# Patient Record
Sex: FEMALE | Race: WHITE | Employment: PART TIME
[De-identification: names, ages, dates, MRNs, and addresses within clinical notes are randomized per-mention and may not be internally consistent; named-entity substitution may affect disease eponyms.]

---

## 2017-03-01 ENCOUNTER — OFFICE VISIT (OUTPATIENT)
Dept: OBGYN | Facility: CLINIC | Age: 22
End: 2017-03-01

## 2017-03-01 VITALS
SYSTOLIC BLOOD PRESSURE: 128 MMHG | DIASTOLIC BLOOD PRESSURE: 87 MMHG | HEART RATE: 78 BPM | BODY MASS INDEX: 22.76 KG/M2 | WEIGHT: 141 LBS | RESPIRATION RATE: 16 BRPM

## 2017-03-01 DIAGNOSIS — Z32.02 NEGATIVE PREGNANCY TEST: ICD-10-CM

## 2017-03-01 DIAGNOSIS — Z30.017 ENCOUNTER FOR INSERTION SUBDERMAL CONTRACEPTIVE: Primary | ICD-10-CM

## 2017-03-01 PROCEDURE — 81025 URINE PREGNANCY TEST: CPT | Performed by: SPECIALIST

## 2017-03-01 PROCEDURE — 11981 INSERTION DRUG DLVR IMPLANT: CPT | Performed by: SPECIALIST

## 2017-03-01 ASSESSMENT — ENCOUNTER SYMPTOMS
NAUSEA: 0
APNEA: 0
COUGH: 0
CONSTIPATION: 0
ABDOMINAL DISTENTION: 0
ABDOMINAL PAIN: 0
DIARRHEA: 0
EYE PAIN: 0
VOMITING: 0

## 2017-04-21 ENCOUNTER — OFFICE VISIT (OUTPATIENT)
Dept: OBGYN CLINIC | Age: 22
End: 2017-04-21
Payer: COMMERCIAL

## 2017-04-21 VITALS
BODY MASS INDEX: 22.18 KG/M2 | RESPIRATION RATE: 18 BRPM | HEIGHT: 66 IN | SYSTOLIC BLOOD PRESSURE: 125 MMHG | WEIGHT: 138 LBS | DIASTOLIC BLOOD PRESSURE: 92 MMHG | HEART RATE: 73 BPM

## 2017-04-21 DIAGNOSIS — Z30.46 NEXPLANON REMOVAL: Primary | ICD-10-CM

## 2017-04-21 PROCEDURE — 11982 REMOVE DRUG IMPLANT DEVICE: CPT | Performed by: SPECIALIST

## 2017-04-21 ASSESSMENT — ENCOUNTER SYMPTOMS
ABDOMINAL PAIN: 0
NAUSEA: 1
CONSTIPATION: 0
APNEA: 0
DIARRHEA: 0
EYE PAIN: 0
ABDOMINAL DISTENTION: 0
VOMITING: 0
COUGH: 0

## 2018-06-20 ENCOUNTER — OFFICE VISIT (OUTPATIENT)
Dept: PRIMARY CARE CLINIC | Age: 23
End: 2018-06-20
Payer: COMMERCIAL

## 2018-06-20 VITALS
DIASTOLIC BLOOD PRESSURE: 60 MMHG | HEIGHT: 66 IN | SYSTOLIC BLOOD PRESSURE: 122 MMHG | WEIGHT: 145.6 LBS | HEART RATE: 85 BPM | OXYGEN SATURATION: 98 % | BODY MASS INDEX: 23.4 KG/M2

## 2018-06-20 DIAGNOSIS — G56.01 CARPAL TUNNEL SYNDROME OF RIGHT WRIST: Primary | ICD-10-CM

## 2018-06-20 DIAGNOSIS — Z23 NEED FOR DIPHTHERIA-TETANUS-PERTUSSIS (TDAP) VACCINE, ADULT/ADOLESCENT: ICD-10-CM

## 2018-06-20 DIAGNOSIS — Z76.89 ENCOUNTER TO ESTABLISH CARE: ICD-10-CM

## 2018-06-20 PROCEDURE — 90715 TDAP VACCINE 7 YRS/> IM: CPT | Performed by: NURSE PRACTITIONER

## 2018-06-20 PROCEDURE — 90471 IMMUNIZATION ADMIN: CPT | Performed by: NURSE PRACTITIONER

## 2018-06-20 PROCEDURE — 99204 OFFICE O/P NEW MOD 45 MIN: CPT | Performed by: NURSE PRACTITIONER

## 2018-06-20 RX ORDER — IBUPROFEN 800 MG/1
800 TABLET ORAL EVERY 8 HOURS PRN
Qty: 90 TABLET | Refills: 1 | Status: SHIPPED | OUTPATIENT
Start: 2018-06-20 | End: 2020-09-18 | Stop reason: ALTCHOICE

## 2018-06-20 ASSESSMENT — ENCOUNTER SYMPTOMS
SHORTNESS OF BREATH: 0
CHEST TIGHTNESS: 0
TROUBLE SWALLOWING: 0

## 2018-06-20 ASSESSMENT — PATIENT HEALTH QUESTIONNAIRE - PHQ9
SUM OF ALL RESPONSES TO PHQ QUESTIONS 1-9: 0
2. FEELING DOWN, DEPRESSED OR HOPELESS: 0
SUM OF ALL RESPONSES TO PHQ9 QUESTIONS 1 & 2: 0
1. LITTLE INTEREST OR PLEASURE IN DOING THINGS: 0

## 2018-08-28 ENCOUNTER — OFFICE VISIT (OUTPATIENT)
Dept: PRIMARY CARE CLINIC | Age: 23
End: 2018-08-28
Payer: COMMERCIAL

## 2018-08-28 VITALS
BODY MASS INDEX: 24.59 KG/M2 | HEIGHT: 64 IN | WEIGHT: 144 LBS | SYSTOLIC BLOOD PRESSURE: 119 MMHG | OXYGEN SATURATION: 100 % | DIASTOLIC BLOOD PRESSURE: 76 MMHG | HEART RATE: 75 BPM

## 2018-08-28 DIAGNOSIS — G56.01 CARPAL TUNNEL SYNDROME ON RIGHT: Primary | ICD-10-CM

## 2018-08-28 PROCEDURE — 99213 OFFICE O/P EST LOW 20 MIN: CPT | Performed by: NURSE PRACTITIONER

## 2018-08-28 RX ORDER — PREDNISONE 20 MG/1
40 TABLET ORAL DAILY
Qty: 10 TABLET | Refills: 0 | Status: SHIPPED | OUTPATIENT
Start: 2018-08-28 | End: 2018-09-02

## 2018-08-28 ASSESSMENT — ENCOUNTER SYMPTOMS
CHEST TIGHTNESS: 0
SHORTNESS OF BREATH: 0
COLOR CHANGE: 0
TROUBLE SWALLOWING: 0

## 2018-08-28 NOTE — PATIENT INSTRUCTIONS
Patient Education        Carpal Tunnel Syndrome: Care Instructions  Your Care Instructions    Carpal tunnel syndrome is a nerve problem. It can cause tingling, numbness, weakness, or pain in the fingers, thumb, and hand. The median nerve and several tough tissues called tendons run through a space in the wrist called the carpal tunnel. The repeated hand motions used in work and some hobbies and sports can put pressure on the nerve. Pregnancy and several conditions, including diabetes, arthritis, and an underactive thyroid, also can cause carpal tunnel syndrome. You may be able to limit an activity or do it differently to reduce your symptoms. You also can take other steps to feel better. If your symptoms are mild, 1 to 2 weeks of home treatment are likely to ease your pain. Surgery is needed only if other treatments do not work. Follow-up care is a key part of your treatment and safety. Be sure to make and go to all appointments, and call your doctor if you are having problems. It's also a good idea to know your test results and keep a list of the medicines you take. How can you care for yourself at home? · If possible, stop or reduce the activity that causes your symptoms. If you cannot stop the activity, take frequent breaks to rest and stretch or change hand positions to do a task. Try switching hands, such as when using a computer mouse. · Try to avoid bending or twisting your wrists. · Ask your doctor if you can take an over-the-counter pain medicine, such as acetaminophen (Tylenol), ibuprofen (Advil, Motrin), or naproxen (Aleve). Be safe with medicines. Read and follow all instructions on the label. · If your doctor prescribes corticosteroid medicine to help reduce pain and swelling, take it exactly as prescribed. Call your doctor if you think you are having a problem with your medicine. · Put ice or a cold pack on your wrist for 10 to 20 minutes at a time to ease pain.  Put a thin cloth between the ice who are sick or have infections. Do not receive a \"live\" vaccine while using prednisone. Call your doctor at once if you have shortness of breath, severe pain in your upper stomach, bloody or tarry stools, severe depression, changes in personality or behavior, vision problems, or eye pain. You should not stop using prednisone suddenly. Follow your doctor's instructions about tapering your dose. What is prednisone? Prednisone is a steroid. Prednisone prevents the release of substances in the body that cause inflammation. Prednisone also suppresses the immune system. Prednisone is used as an anti-inflammatory or an immunosuppressant medication. Prednisone treats many different conditions such as allergic disorders, skin conditions, ulcerative colitis, arthritis, lupus, psoriasis, or breathing disorders. Prednisone may also be used for purposes not listed in this medication guide. What should I discuss with my healthcare provider before taking prednisone? You should not use this medication if you are allergic to prednisone, or if you have a fungal infection anywhere in your body. Steroid medication can weaken your immune system, making it easier for you to get an infection or worsening an infection you already have or have recently had. Tell your doctor about any illness or infection you have had within the past several weeks. To make sure prednisone is safe for you, tell your doctor if you have:  · any illness that causes diarrhea;  · liver disease (such as cirrhosis);  · kidney disease;  · heart disease, high blood pressure, low levels of potassium in your blood;  · a thyroid disorder;  · diabetes;  · a history of malaria;  · tuberculosis;  · osteoporosis;  · glaucoma, cataracts, or herpes infection of the eyes;  · stomach ulcers, ulcerative colitis, or a history of stomach bleeding;  · a muscle disorder such as myasthenia gravis; or  · depression or mental illness.   Long-term use of steroids may lead to treats you should know that you are using a steroid. Store at room temperature away from moisture and heat. What happens if I miss a dose? Take the missed dose as soon as you remember. Skip the missed dose if it is almost time for your next scheduled dose. Do not take extra medicine to make up the missed dose. What happens if I overdose? Seek emergency medical attention or call the Poison Help line at 1-878.861.4308. An overdose of prednisone is not expected to produce life threatening symptoms. However, long term use of high steroid doses can lead to symptoms such as thinning skin, easy bruising, changes in the shape or location of body fat (especially in your face, neck, back, and waist), increased acne or facial hair, menstrual problems, impotence, or loss of interest in sex. What should I avoid while taking prednisone? Avoid being near people who are sick or have infections. Call your doctor for preventive treatment if you are exposed to chicken pox or measles. These conditions can be serious or even fatal in people who are using a steroid. Do not receive a \"live\" vaccine while using prednisone. Prednisone may increase your risk of harmful effects from a live vaccine. Live vaccines include measles, mumps, rubella (MMR), rotavirus, typhoid, yellow fever, varicella (chickenpox), zoster (shingles), and nasal flu (influenza) vaccine. Avoid drinking alcohol while you are taking prednisone. What are the possible side effects of prednisone? Get emergency medical help if you have any of these signs of an allergic reaction: hives; difficult breathing; swelling of your face, lips, tongue, or throat.   Call your doctor at once if you have:  · blurred vision, eye pain, or seeing halos around lights;  · swelling, rapid weight gain, feeling short of breath;  · severe depression, feelings of extreme happiness or sadness, changes in personality or behavior, seizure (convulsions);  · bloody or tarry stools, coughing

## 2020-08-21 ENCOUNTER — HOSPITAL ENCOUNTER (EMERGENCY)
Age: 25
Discharge: HOME | End: 2020-08-21
Attending: EMERGENCY MEDICINE

## 2020-08-21 ENCOUNTER — APPOINTMENT (OUTPATIENT)
Dept: CT IMAGING | Age: 25
End: 2020-08-21

## 2020-08-21 ENCOUNTER — APPOINTMENT (OUTPATIENT)
Dept: ULTRASOUND IMAGING | Age: 25
End: 2020-08-21

## 2020-08-21 VITALS
RESPIRATION RATE: 14 BRPM | HEART RATE: 84 BPM | WEIGHT: 169 LBS | DIASTOLIC BLOOD PRESSURE: 85 MMHG | OXYGEN SATURATION: 98 % | HEIGHT: 66 IN | BODY MASS INDEX: 27.16 KG/M2 | TEMPERATURE: 98.1 F | SYSTOLIC BLOOD PRESSURE: 119 MMHG

## 2020-08-21 LAB
HCG(URINE) PREGNANCY TEST: NEGATIVE
INR BLD: 1
PARTIAL THROMBOPLASTIN TIME: 25.7 SEC (ref 21.3–31.3)
PROTHROMBIN TIME: 10.4 SEC (ref 9.4–12.6)

## 2020-08-21 PROCEDURE — 99284 EMERGENCY DEPT VISIT MOD MDM: CPT

## 2020-08-21 PROCEDURE — 36415 COLL VENOUS BLD VENIPUNCTURE: CPT

## 2020-08-21 PROCEDURE — 6370000000 HC RX 637 (ALT 250 FOR IP): Performed by: EMERGENCY MEDICINE

## 2020-08-21 PROCEDURE — 72192 CT PELVIS W/O DYE: CPT

## 2020-08-21 PROCEDURE — 93971 EXTREMITY STUDY: CPT

## 2020-08-21 PROCEDURE — 72131 CT LUMBAR SPINE W/O DYE: CPT

## 2020-08-21 PROCEDURE — 81025 URINE PREGNANCY TEST: CPT

## 2020-08-21 PROCEDURE — 85730 THROMBOPLASTIN TIME PARTIAL: CPT

## 2020-08-21 PROCEDURE — 85610 PROTHROMBIN TIME: CPT

## 2020-08-21 RX ADMIN — APIXABAN 10 MG: 5 TABLET, FILM COATED ORAL at 17:37

## 2020-08-21 ASSESSMENT — PAIN SCALES - GENERAL: PAINLEVEL_OUTOF10: 4

## 2020-08-21 NOTE — LETTER
[id number] [facility] ED  [address] [facility] [address]  Phone: [phone]  Fax: [phone]               August 21, 2020    Patient: Irlanda Rivera   YOB: 1995   Date of Visit: 8/21/2020       To Whom It May Concern:    Kyle Valdes was seen and treated in our emergency department on 8/21/2020. She may return to work on 08.       Sincerely,       Jadyn Nunez MD         Signature:__________________________________

## 2020-08-21 NOTE — ED PROVIDER NOTES
[facility]  eMERGENCY dEPARTMENT eNCOUnter      Pt Name: Terry Mera  MRN: [id number]  [place] 1995  Date of evaluation: 8/21/2020      CHIEF COMPLAINT       Chief Complaint   Patient presents with    Leg Swelling     Left leg    Leg Pain         HISTORY OF PRESENT ILLNESS      The patient presents with pain in the left hip and back. She wasn't sure if she had swelling. Contrary to what the nurse reports, she is not here for swelling, but rather pain. She started having some back pain several days ago. She said ibuprofen took the pain away completely, but then it would come back. She is having pain along the lateral aspect of the left hip, and towards the groin medially. The pain in the groin is at rest.  The pain in the lateral hip is with movement. She says she is not really having the back pain anymore. This was in the low back across the lumbar area. The patient says she is not having true weakness or numbness. She says she feels fullness in the thigh, but no actual numbness. She has no functional deficits. She denies bowel or bladder issues. She denies fever. She says the pain is tolerable at this time. She is not having any swelling or redness of the calf or thigh. She does not recall any trauma. REVIEW OF SYSTEMS       All systems reviewed and negative unless noted in HPI. The patient denies fever or constitutional symptoms. Denies vision change. Denies any sore throat or rhinorrhea. Denies any neck pain or stiffness. Denies chest pain or shortness of breath. No nausea,  vomiting or diarrhea. Denies any dysuria. Denies urinary frequency or hematuria. Back and left hip pain as noted in HPI. Denies any weakness, numbness or focal neurologic deficit. Denies any skin rash or edema. No recent psychiatric issues. No easy bruising or bleeding. Denies any polyuria, polydypsia or history of immunocompromise.       PAST MEDICAL HISTORY    has a past medical history of Ultrasound recheck of fetal pyelectasis, antepartum. SURGICAL HISTORY      has a past surgical history that includes Carpal tunnel release (Right). CURRENT MEDICATIONS       Previous Medications    ELASTIC BANDAGES & SUPPORTS (WRIST SPLINT) MISC    Apply 1 each topically nightly Cock up wrist splint- right wrist    IBUPROFEN (IBU) 800 MG TABLET    Take 1 tablet by mouth every 8 hours as needed for Pain Take with food       ALLERGIES     has No Known Allergies. FAMILY HISTORY     She indicated that her mother is alive. She indicated that her father is alive. She indicated that her sister is alive. She indicated that her brother is alive. She indicated that her maternal grandmother is alive. She indicated that her maternal grandfather is alive. She indicated that her paternal grandmother is . She indicated that her paternal grandfather is . She indicated that the status of her neg hx is unknown.     family history includes Hypertension in her maternal grandmother. SOCIAL HISTORY      reports that she has never smoked. She has never used smokeless tobacco. She reports that she does not drink alcohol or use drugs. PHYSICAL EXAM     INITIAL VITALS:  height is 5' 6\" (1.676 m) and weight is 76.7 kg (169 lb). Her temperature is 98.1 °F (36.7 °C). Her blood pressure is 117/91 (abnormal) and her pulse is 82. Her respiration is 15 and oxygen saturation is 98%. Patient is alert and oriented, in no apparent distress. HEENT is atraumatic. Pupils are PERRL at 4 mm with normal extraocular motion. Mucous membranes moist.    Neck is supple with no lymphadenopathy. No JVD. No meningismus. Heart sounds regular rate and rhythm with no gallops, murmurs, or rubs. Lungs clear, no wheezes, rales or rhonchi. Abdomen: soft, nontender with no pain to palpation. No pulsatile mass. Normal bowel sounds are noted. No rebound or guarding. within the pelvis.  No bladder calculus.  Inflammatory changes   along the left iliac vein extending into the left groin region.  The left   iliac vein appears enlarged. CT LUMBAR SPINE:     No acute fracture.  No subluxation.  Disc spaces are preserved.  No   significant degenerative changes.                  Preliminary result by Soledad Ulloa MD (08/21/20 16:01:16)                 Impression:     Left retroperitoneal inflammatory changes which appear to be centered upon   the iliac vein suggesting thrombophlebitis.  Consider follow-up with left   lower extremity deep venous sonography. No other acute findings involving the lumbar spine or pelvis.                       CT LUMBAR SPINE WO CONTRAST (Preliminary result)   Result time 08/21/20 16:05:43   Preliminary result by Soledad Ulloa MD (08/21/20 16:05:43)                 Impression:     Left retroperitoneal inflammatory changes which appear to be centered upon   the iliac vein suggesting thrombophlebitis.  Consider follow-up with left   lower extremity deep venous sonography. No other acute findings involving the lumbar spine or pelvis. Narrative:     EXAMINATION:   CT OF THE PELVIS WITHOUT CONTRAST; CT OF THE LUMBAR SPINE WITHOUT CONTRAST   8/21/2020 3:24 pm     TECHNIQUE:   CT of the pelvis was performed without the administration of intravenous   contrast.  Multiplanar reformatted images are provided for review. Dose   modulation, iterative reconstruction, and/or weight based adjustment of the   mA/kV was utilized to reduce the radiation dose to as low as reasonably   achievable.; CT of the lumbar spine was performed without the administration   of intravenous contrast. Multiplanar reformatted images are provided for   review. Dose modulation, iterative reconstruction, and/or weight based   adjustment of the mA/kV was utilized to reduce the radiation dose to as low   as reasonably achievable. COMPARISON:   None. HISTORY:   ORDERING SYSTEM PROVIDED HISTORY: left hip pain   TECHNOLOGIST PROVIDED HISTORY:   left hip pain     Is the patient pregnant?->No   Reason for Exam: Pt states has had recent low back pain for about a week and   now has anterior left leg numbness and pain for the last 3 days.  No known   injury   Acuity: Acute   Type of Exam: Initial; ORDERING SYSTEM PROVIDED HISTORY: pain   TECHNOLOGIST PROVIDED HISTORY:   pain   Is the patient pregnant?->No   Reason for Exam: Pt states has had recent low back pain for about a week and   now has anterior left leg numbness and pain for the last 3 days.  No known   injury   Acuity: Acute   Type of Exam: Initial     FINDINGS:   CT PELVIS:     No acute fracture.  No widening of the sacroiliac joints.  No erosive   changes.  Hip joint spaces are preserved. No free fluid within the pelvis.  No bladder calculus.  Inflammatory changes   along the left iliac vein extending into the left groin region.  The left   iliac vein appears enlarged. CT LUMBAR SPINE:     No acute fracture.  No subluxation.  Disc spaces are preserved.  No   significant degenerative changes.                  Preliminary result by Shraddha Tsai MD (08/21/20 16:01:16)                 Impression:     Left retroperitoneal inflammatory changes which appear to be centered upon   the iliac vein suggesting thrombophlebitis.  Consider follow-up with left   lower extremity deep venous sonography.      No other acute findings involving the lumbar spine or pelvis.                        LABS:  Results for orders placed or performed during the hospital encounter of 08/21/20   Pregnancy, Urine   Result Value Ref Range    HCG(Urine) Pregnancy Test NEGATIVE NEGATIVE   Protime-INR   Result Value Ref Range    Protime 10.4 9.4 - 12.6 sec    INR 1.0    APTT   Result Value Ref Range    PTT 25.7 21.3 - 31.3 sec         EMERGENCY DEPARTMENT COURSE:   Vitals:    Vitals:    08/21/20 1433 08/21/20 1634   BP: (!) 124/107 (!) 117/91   Pulse: 103 82   Resp: 16 15   Temp: 98.1 °F (36.7 °C)    SpO2: 99% 98%   Weight: 76.7 kg (169 lb)    Height: 5' 6\" (1.676 m)      -------------------------  BP: (!) 117/91(pt asymptomatic), Temp: 98.1 °F (36.7 °C), Pulse: 82, Resp: 15      Re-evaluation Notes    The patient will be started on Eliquis here and written for a starter pack. I have given her referral to a vascular specialist and PCP. He indicates she will not need to have him hypercoagulable workup at this juncture. This may be something to do in the future. She is discharged in good condition. CONSULTS:    2326  Discussed with [facility]. Confirms DVT clot in femoral, common femoral, and popliteal veins. [id number]  Vascular surgeon paged. [phone]  Discussed with Dr. Alina Her. Ileofemoral is more concerning. Looks acute on U/S. Start on Eliquis starter pack. Can see in office in 1-2 weeks. FINAL IMPRESSION      1. Acute deep vein thrombosis (DVT) of left lower extremity, unspecified vein (HCC)          DISPOSITION/PLAN   DISPOSITION        Condition on Disposition    good    PATIENT REFERRED TO:  Carito Rudd MD    [facility]  [phone]      1-2 weeks    José Miguel Manzano MD  [facility] [address] [place] [address] Road  [phone]    In 3 days        DISCHARGE MEDICATIONS:  New Prescriptions    APIXABAN (ELIQUIS DVT/PE STARTER PACK) 5 MG TABS TABLET    Take 10 mg (2 tablets) orally twice daily for 7 days, then take 5 mg (1 tablet) orally twice daily thereafter. May substitute tablets if starter pack not available.        (Please note that portions of this note were completed with a voice recognition program.  Efforts were made to edit the dictations but occasionally words are mis-transcribed.)    Plata MD   Attending Emergency Physician         Dayron Hays MD  08/21/20 [id number]

## 2020-08-21 NOTE — ED NOTES
Pt arrives to ER with c/o left leg pain and swelling. She denies injury or trauma. She states that she had back pain, which now has resolved. She states that her pain is in her thigh. Pt ambulates with a limp. Pt resting in bed, comfort offered, no concerns no s/s of distress.       Paola De La Paz RN  08/21/20 [id number]

## 2020-08-21 NOTE — ED NOTES
Dr Jigna Sinclair at bedside updating patient on results and plan of care.       Russell Pierre RN  08/21/20 [id number]

## 2020-08-24 ENCOUNTER — TELEPHONE (OUTPATIENT)
Dept: PRIMARY CARE CLINIC | Age: 25
End: 2020-08-24

## 2020-08-24 ENCOUNTER — NURSE TRIAGE (OUTPATIENT)
Dept: OTHER | Facility: CLINIC | Age: 25
End: 2020-08-24

## 2020-08-24 ENCOUNTER — OFFICE VISIT (OUTPATIENT)
Dept: PRIMARY CARE CLINIC | Age: 25
End: 2020-08-24

## 2020-08-24 VITALS
WEIGHT: 169 LBS | DIASTOLIC BLOOD PRESSURE: 80 MMHG | HEART RATE: 136 BPM | OXYGEN SATURATION: 98 % | SYSTOLIC BLOOD PRESSURE: 118 MMHG | HEIGHT: 66 IN | BODY MASS INDEX: 27.16 KG/M2

## 2020-08-24 PROCEDURE — 99214 OFFICE O/P EST MOD 30 MIN: CPT | Performed by: FAMILY MEDICINE

## 2020-08-24 RX ORDER — SUMATRIPTAN 25 MG/1
25 TABLET, FILM COATED ORAL
Qty: 9 TABLET | Refills: 3 | Status: SHIPPED | OUTPATIENT
Start: 2020-08-24 | End: 2021-04-12

## 2020-08-24 ASSESSMENT — PATIENT HEALTH QUESTIONNAIRE - PHQ9
SUM OF ALL RESPONSES TO PHQ9 QUESTIONS 1 & 2: 0
1. LITTLE INTEREST OR PLEASURE IN DOING THINGS: 0
SUM OF ALL RESPONSES TO PHQ QUESTIONS 1-9: 0
SUM OF ALL RESPONSES TO PHQ QUESTIONS 1-9: 0
2. FEELING DOWN, DEPRESSED OR HOPELESS: 0

## 2020-08-24 NOTE — PROGRESS NOTES
2014      Past Surgical History:   Procedure Laterality Date    CARPAL TUNNEL RELEASE Right        Family History   Problem Relation Age of Onset    Hypertension Maternal Grandmother     Breast Cancer Neg Hx     Cancer Neg Hx     Colon Cancer Neg Hx     Diabetes Neg Hx     Eclampsia Neg Hx     Ovarian Cancer Neg Hx      Labor Neg Hx     Spont Abortions Neg Hx     Stroke Neg Hx        Social History     Tobacco Use    Smoking status: Never Smoker    Smokeless tobacco: Never Used   Substance Use Topics    Alcohol use: No      Current Outpatient Medications   Medication Sig Dispense Refill    SUMAtriptan (IMITREX) 25 MG tablet Take 1 tablet by mouth once as needed for Migraine May take an additional dose 2 hours after if needed once 9 tablet 3    apixaban (ELIQUIS DVT/PE STARTER PACK) 5 MG TABS tablet Take 10 mg (2 tablets) orally twice daily for 7 days, then take 5 mg (1 tablet) orally twice daily thereafter. May substitute tablets if starter pack not available. 74 tablet 0    Elastic Bandages & Supports (WRIST SPLINT) MISC Apply 1 each topically nightly Cock up wrist splint- right wrist 1 each 0    ibuprofen (IBU) 800 MG tablet Take 1 tablet by mouth every 8 hours as needed for Pain Take with food 90 tablet 1     No current facility-administered medications for this visit. No Known Allergies    Health Maintenance   Topic Date Due    Varicella vaccine (1 of 2 - 2-dose childhood series) 1994    HPV vaccine (1 - 2-dose series) 2004    Cervical cancer screen  2014    Flu vaccine (1) 2020    DTaP/Tdap/Td vaccine (2 - Td) 2028    Hepatitis B vaccine  Completed    HIV screen  Completed    Hepatitis A vaccine  Aged Out    Hib vaccine  Aged Out    Meningococcal (ACWY) vaccine  Aged Out    Pneumococcal 0-64 years Vaccine  Aged Out       Subjective:      Review of Systems   Constitutional: Negative for chills and fever.    Respiratory: Negative for tablet; Refill: 3    3. Pain of left hip joint  - some improvement since starting eliquis, but still with pain, recommend tylenol. Pain 2/2 dvt. Plan:      Return in about 3 months (around 11/24/2020) for physical exam and pap. No orders of the defined types were placed in this encounter. Orders Placed This Encounter   Medications    SUMAtriptan (IMITREX) 25 MG tablet     Sig: Take 1 tablet by mouth once as needed for Migraine May take an additional dose 2 hours after if needed once     Dispense:  9 tablet     Refill:  3        Patient given educational materials - see patient instructions. Discussed use, benefit, and side effects of prescribed medications. All patient questions answered. Pt voiced understanding. Reviewed healthmaintenance. Instructed to continue current medications, diet and exercise. Patient agreed with treatment plan. Follow up as directed.      Electronically signed by Donny Sethi DO on 8/26/2020 at 9:11 PM

## 2020-08-24 NOTE — TELEPHONE ENCOUNTER
Calling for follow up appt from ED visit on Friday for DVT. No worsening symptoms.   Soft transfer back to [facility]    Reason for Disposition   General information question, no triage required and triager able to answer question    Protocols used: INFORMATION ONLY CALL - NO TRIAGE-ADULT-

## 2020-08-26 ASSESSMENT — ENCOUNTER SYMPTOMS
ABDOMINAL PAIN: 0
CHEST TIGHTNESS: 0
NAUSEA: 0
SHORTNESS OF BREATH: 0
VOMITING: 0

## 2020-09-18 ENCOUNTER — OFFICE VISIT (OUTPATIENT)
Dept: VASCULAR SURGERY | Age: 25
End: 2020-09-18

## 2020-09-18 VITALS
DIASTOLIC BLOOD PRESSURE: 78 MMHG | BODY MASS INDEX: 27 KG/M2 | SYSTOLIC BLOOD PRESSURE: 114 MMHG | HEIGHT: 66 IN | HEART RATE: 90 BPM | RESPIRATION RATE: 16 BRPM | WEIGHT: 168 LBS | TEMPERATURE: 96.6 F | OXYGEN SATURATION: 97 %

## 2020-09-18 PROCEDURE — 99024 POSTOP FOLLOW-UP VISIT: CPT | Performed by: SURGERY

## 2020-09-18 NOTE — PROGRESS NOTES
Division of Vascular Surgery        New Consult      Physician Requesting Consult:  Vandana Roberts MD    Reason for Consult:   DVT    Chief Complaint:      DVT, left leg swelling    History of Present Illness:      Fabiaon Calderon is a 30 y.o. woman who presents with left lower extremity DVT about a month ago. She was started on anticoagulation by the ER. She continues to have significant swelling, discoloration and heaviness/pain when on it for long periods of time. She works 10 hour night shifts at the [employer]. She says she was never told to wear compression stockings. She denies any prior history of DVT or significant family history of it. Medical History:     Past Medical History:   Diagnosis Date    Ultrasound recheck of fetal pyelectasis, antepartum 2012       Surgical History:     Past Surgical History:   Procedure Laterality Date    CARPAL TUNNEL RELEASE Right        Family History:     Family History   Problem Relation Age of Onset    Hypertension Maternal Grandmother     Breast Cancer Neg Hx     Cancer Neg Hx     Colon Cancer Neg Hx     Diabetes Neg Hx     Eclampsia Neg Hx     Ovarian Cancer Neg Hx      Labor Neg Hx     Spont Abortions Neg Hx     Stroke Neg Hx        Allergies:       Patient has no known allergies. Medications:      Current Outpatient Medications   Medication Sig Dispense Refill    SUMAtriptan (IMITREX) 25 MG tablet Take 1 tablet by mouth once as needed for Migraine May take an additional dose 2 hours after if needed once 9 tablet 3    apixaban (ELIQUIS DVT/PE STARTER PACK) 5 MG TABS tablet Take 10 mg (2 tablets) orally twice daily for 7 days, then take 5 mg (1 tablet) orally twice daily thereafter. May substitute tablets if starter pack not available.  74 tablet 0    Elastic Bandages & Supports (WRIST SPLINT) MISC Apply 1 each topically nightly Cock up wrist splint- right wrist 1 each 0     No current facility-administered medications for this visit. Social History:     Tobacco:    reports that she has never smoked. She has never used smokeless tobacco.  Alcohol:      reports no history of alcohol use. Drug Use:  reports no history of drug use. Occupation:  Works in the [employer]:     Review of Systems   Constitutional: Negative for chills and fever. HENT: Negative for congestion. Eyes: Negative for visual disturbance. Respiratory: Negative for chest tightness and shortness of breath. Cardiovascular: Positive for leg swelling. Negative for chest pain. Gastrointestinal: Negative for abdominal pain. Endocrine: Negative. Genitourinary: Negative. Musculoskeletal: Negative. Skin: Positive for color change. Negative for wound. Allergic/Immunologic: Negative. Neurological: Negative for facial asymmetry, speech difficulty, weakness and numbness. Hematological: Negative. Psychiatric/Behavioral: Negative. Physical Exam:     Vitals:  /78 (Site: Right Upper Arm, Position: Sitting, Cuff Size: Medium Adult)   Pulse 90   Temp 96.6 °F (35.9 °C) (Temporal)   Resp 16   Ht 5' 6\" (1.676 m)   Wt 168 lb (76.2 kg)   SpO2 97%   BMI 27.12 kg/m²     Physical Exam  Constitutional:       Appearance: She is well-developed and well-groomed. Eyes:      Extraocular Movements: Extraocular movements intact. Conjunctiva/sclera: Conjunctivae normal.   Neck:      Musculoskeletal: Full passive range of motion without pain. Vascular: No carotid bruit. Cardiovascular:      Rate and Rhythm: Normal rate and regular rhythm. Pulses:           Popliteal pulses are 2+ on the left side. Dorsalis pedis pulses are 2+ on the left side. Pulmonary:      Effort: Pulmonary effort is normal. No respiratory distress. Abdominal:      Palpations: Abdomen is soft. Tenderness: There is no abdominal tenderness.    Musculoskeletal:      Left upper leg: She exhibits swelling. She exhibits no tenderness. Left lower leg: She exhibits tenderness and swelling. Left foot: Normal capillary refill. Swelling present. No tenderness. Feet:      Left foot:      Skin integrity: No ulcer or skin breakdown. Skin:     General: Skin is warm. Capillary Refill: Capillary refill takes less than 2 seconds. Comments: Purplish discoloration of left leg with venous congestion   Neurological:      Mental Status: She is alert and oriented to person, place, and time. GCS: GCS eye subscore is 4. GCS verbal subscore is 5. GCS motor subscore is 6. Sensory: Sensation is intact. Motor: Motor function is intact. Psychiatric:         Mood and Affect: Mood normal.         Speech: Speech normal.         Behavior: Behavior normal.         Thought Content: Thought content normal.         Imaging/Labs:     Venous duplex reveals left popliteal to common femoral vein DVT    CT pelvis reveals distention around left iliac vein    Assessment and Plan:     Left lower extremity DVT with significant swelling and phlegmasia  · Continue anticoagulation  · Will need compression therapy, ACE wraps applied and compression stocking given to her  · She will call me back in 1 week to see how her swelling is doing, if not improved will discuss with performing percutaneous mechanical thrombectomy. · Discussed some of the details of the procedure  · She does not have insurance so will only book the procedure if she really needs it    Electronically signed by Abiel Mckinney MD on 9/18/20 at 9:58 AM EDT      [address] [facility]  Office: [phone]  Cell: [phone]  Email: [email or web address]

## 2020-09-19 ASSESSMENT — ENCOUNTER SYMPTOMS
ABDOMINAL PAIN: 0
ALLERGIC/IMMUNOLOGIC NEGATIVE: 1
COLOR CHANGE: 1
SHORTNESS OF BREATH: 0
CHEST TIGHTNESS: 0

## 2020-09-28 ENCOUNTER — PATIENT MESSAGE (OUTPATIENT)
Dept: PRIMARY CARE CLINIC | Age: 25
End: 2020-09-28

## 2020-09-28 NOTE — TELEPHONE ENCOUNTER
From: Jose May  To: Janelle Sheridan DO  Sent: 9/28/2020 4:55 PM EDT  Subject: Prescription Question    I was wondering if I could get another of the samples for eliquis. I cant get help through their website. Can I be switched to warfarin or something affordable until my insurance kicks in at the end of November? I can not afford eliquis and if my prescription cant be changed then Ill have to stop taking them all together until my insurance starts. I work a lot, so if you try to call and I do not answer, its okay to leave a voicemail.      Thank you

## 2020-09-29 ENCOUNTER — TELEPHONE (OUTPATIENT)
Dept: VASCULAR SURGERY | Age: 25
End: 2020-09-29

## 2020-09-29 RX ORDER — WARFARIN SODIUM 5 MG/1
TABLET ORAL
Qty: 30 TABLET | Refills: 2 | Status: SHIPPED | OUTPATIENT
Start: 2020-09-29 | End: 2020-10-09

## 2020-10-09 ENCOUNTER — OFFICE VISIT (OUTPATIENT)
Dept: VASCULAR SURGERY | Age: 25
End: 2020-10-09

## 2020-10-09 VITALS
DIASTOLIC BLOOD PRESSURE: 87 MMHG | HEART RATE: 82 BPM | SYSTOLIC BLOOD PRESSURE: 127 MMHG | RESPIRATION RATE: 18 BRPM | HEIGHT: 66 IN | WEIGHT: 164 LBS | TEMPERATURE: 97.1 F | BODY MASS INDEX: 26.36 KG/M2

## 2020-10-09 PROCEDURE — 99024 POSTOP FOLLOW-UP VISIT: CPT | Performed by: SURGERY

## 2020-10-12 NOTE — PROGRESS NOTES
Mrs. Shaneka Lopez comes in today for re-evaluation of her left leg DVT. She has been compliant with her medications with Eliquis and has been wearing the compression stockings. Her swelling has improved significantly along with the discoloration and the pain she was having. She continues to work at [email or web address], 3rd shift. Continue to wear compression stockings and will need total of 6 months of therapy for her DVT. She will follow up then with repeat venous duplex for re-evaluation. Ok to continue to work.         Electronically signed by Jose Alejandro Amaro MD on 10/12/2020 at 8:41 AM

## 2021-02-25 ENCOUNTER — PATIENT MESSAGE (OUTPATIENT)
Dept: PRIMARY CARE CLINIC | Age: 26
End: 2021-02-25

## 2021-02-25 NOTE — TELEPHONE ENCOUNTER
From: Carmen Kern  To: Jeff Saenz DO  Sent: 2/25/2021 8:07 AM EST  Subject: Prescription Question    Hello,  I was wondering if you could refill my prescription for the eliquis?   Thank you

## 2021-04-08 DIAGNOSIS — I82.4Y3 DEEP VEIN THROMBOSIS (DVT) OF PROXIMAL VEIN OF BOTH LOWER EXTREMITIES, UNSPECIFIED CHRONICITY (HCC): Primary | ICD-10-CM

## 2021-04-09 ENCOUNTER — HOSPITAL ENCOUNTER (OUTPATIENT)
Dept: VASCULAR LAB | Age: 26
Discharge: HOME | End: 2021-04-09
Payer: COMMERCIAL

## 2021-04-09 ENCOUNTER — OFFICE VISIT (OUTPATIENT)
Dept: VASCULAR SURGERY | Age: 26
End: 2021-04-09
Payer: COMMERCIAL

## 2021-04-09 VITALS
OXYGEN SATURATION: 98 % | HEART RATE: 77 BPM | WEIGHT: 160 LBS | TEMPERATURE: 99.5 F | HEIGHT: 66 IN | SYSTOLIC BLOOD PRESSURE: 106 MMHG | BODY MASS INDEX: 25.71 KG/M2 | DIASTOLIC BLOOD PRESSURE: 80 MMHG

## 2021-04-09 DIAGNOSIS — I82.4Y3 DEEP VEIN THROMBOSIS (DVT) OF PROXIMAL VEIN OF BOTH LOWER EXTREMITIES, UNSPECIFIED CHRONICITY (HCC): ICD-10-CM

## 2021-04-09 DIAGNOSIS — I82.422 ACUTE DEEP VEIN THROMBOSIS (DVT) OF ILIAC VEIN OF LEFT LOWER EXTREMITY (HCC): Primary | ICD-10-CM

## 2021-04-09 PROCEDURE — 93970 EXTREMITY STUDY: CPT

## 2021-04-09 PROCEDURE — 1036F TOBACCO NON-USER: CPT | Performed by: SURGERY

## 2021-04-09 PROCEDURE — 99214 OFFICE O/P EST MOD 30 MIN: CPT | Performed by: SURGERY

## 2021-04-09 PROCEDURE — G8427 DOCREV CUR MEDS BY ELIG CLIN: HCPCS | Performed by: SURGERY

## 2021-04-09 PROCEDURE — G8419 CALC BMI OUT NRM PARAM NOF/U: HCPCS | Performed by: SURGERY

## 2021-04-09 RX ORDER — APIXABAN 5 MG/1
1 TABLET, FILM COATED ORAL 2 TIMES DAILY
COMMUNITY
Start: 2021-04-03 | End: 2021-04-15 | Stop reason: ALTCHOICE

## 2021-04-09 NOTE — PROGRESS NOTES
Division of Vascular Surgery        Follow Up      Left popliteal to external iliac vein DVT (2020)    Chief Complaint:      DVT follow up    History of Present Illness:      Gilma Proctor is a 30 y.o. woman who presents for follow up regarding her left leg DVT. She has been compliant with her anticoagulation and denies any bleeding issues. She is back to working the third shift at the [employer]. Swelling in her leg has completely subsided and she has been wearing compression stockings while at work and has noticed significant relief. She denies any pain or discomfort in her legs now. Medical History:     Past Medical History:   Diagnosis Date    Ultrasound recheck of fetal pyelectasis, antepartum 2012     Surgical History:     Past Surgical History:   Procedure Laterality Date    CARPAL TUNNEL RELEASE Right      Family History:     Family History   Problem Relation Age of Onset    Hypertension Maternal Grandmother     Breast Cancer Neg Hx     Cancer Neg Hx     Colon Cancer Neg Hx     Diabetes Neg Hx     Eclampsia Neg Hx     Ovarian Cancer Neg Hx      Labor Neg Hx     Spont Abortions Neg Hx     Stroke Neg Hx      Allergies:       Patient has no known allergies. Medications:      Current Outpatient Medications   Medication Sig Dispense Refill    ELIQUIS 5 MG TABS tablet Take 1 tablet by mouth 2 times daily      SUMAtriptan (IMITREX) 25 MG tablet Take 1 tablet by mouth once as needed for Migraine May take an additional dose 2 hours after if needed once 9 tablet 3     No current facility-administered medications for this visit. Social History:     Tobacco:    reports that she has never smoked. She has never used smokeless tobacco.  Alcohol:      reports no history of alcohol use. Drug Use:  reports no history of drug use. Occupation:  [employer]    Review of Systems:     Review of Systems   Constitutional: Negative for chills and fever.    HENT: Negative for congestion. Eyes: Negative for visual disturbance. Respiratory: Negative for chest tightness and shortness of breath. Cardiovascular: Negative for chest pain and leg swelling. Gastrointestinal: Negative for abdominal pain. Endocrine: Negative. Genitourinary: Negative. Musculoskeletal: Negative. Skin: Negative for color change and wound. Allergic/Immunologic: Negative. Neurological: Negative for facial asymmetry, speech difficulty, weakness and numbness. Hematological: Negative. Psychiatric/Behavioral: Negative. Physical Exam:     Vitals:  /80 (Site: Left Upper Arm, Position: Sitting, Cuff Size: Medium Adult)   Pulse 77   Temp 99.5 °F (37.5 °C) (Temporal)   Ht 5' 6\" (1.676 m)   Wt 160 lb (72.6 kg)   SpO2 98%   BMI 25.82 kg/m²     Physical Exam  Constitutional:       Appearance: She is well-developed and well-groomed. Eyes:      Extraocular Movements: Extraocular movements intact. Conjunctiva/sclera: Conjunctivae normal.   Neck:      Musculoskeletal: Full passive range of motion without pain. Vascular: No carotid bruit. Cardiovascular:      Rate and Rhythm: Normal rate and regular rhythm. Pulses:           Radial pulses are 2+ on the right side and 2+ on the left side. Dorsalis pedis pulses are 2+ on the right side and 2+ on the left side. Posterior tibial pulses are 2+ on the right side and 2+ on the left side. Pulmonary:      Effort: Pulmonary effort is normal. No respiratory distress. Abdominal:      Palpations: Abdomen is soft. Tenderness: There is no abdominal tenderness. Musculoskeletal:      Right lower leg: She exhibits no tenderness and no swelling. No edema. Left lower leg: She exhibits no tenderness and no swelling. No edema. Right foot: Normal capillary refill. No tenderness or swelling. Left foot: Normal capillary refill. No tenderness or swelling.    Feet:      Right foot:      Skin integrity: No ulcer or skin breakdown. Left foot:      Skin integrity: No ulcer or skin breakdown. Skin:     General: Skin is warm. Capillary Refill: Capillary refill takes less than 2 seconds. Neurological:      Mental Status: She is alert and oriented to person, place, and time. GCS: GCS eye subscore is 4. GCS verbal subscore is 5. GCS motor subscore is 6. Sensory: Sensation is intact. Motor: Motor function is intact. Psychiatric:         Mood and Affect: Mood normal.         Speech: Speech normal.         Behavior: Behavior normal.         Thought Content: Thought content normal.     September 2020 October 2020 April 2020          Imaging/Labs:     Venous duplex (4/9/21) partial compression of left common femoral vein suggestive of recanalization from previous DVT    Assessment and Plan:     Left leg extensive DVT  · 6 months of anticoagulation is enough therapy and she can stop Eliquis  · Continue to wear compression stockings to help prevent post-thrombotic syndrome (chronic swelling/pain in leg after DVT)  · Low suspicion of May Thurner Syndrome, CT imaging reviewed does not suggest compression of left iliac vein  · She will follow up as needed    Electronically signed by Shree Fenton MD on 4/9/21 at 10:41 AM EDT      [address] [facility],[address]: [phone]  C: [phone]  Email: [email or web address]

## 2021-04-12 ENCOUNTER — TELEMEDICINE (OUTPATIENT)
Dept: PRIMARY CARE CLINIC | Age: 26
End: 2021-04-12
Payer: COMMERCIAL

## 2021-04-12 DIAGNOSIS — U07.1 COVID-19: Primary | ICD-10-CM

## 2021-04-12 DIAGNOSIS — Z86.718 HISTORY OF DVT OF LOWER EXTREMITY: ICD-10-CM

## 2021-04-12 PROCEDURE — 99213 OFFICE O/P EST LOW 20 MIN: CPT | Performed by: FAMILY MEDICINE

## 2021-04-12 PROCEDURE — 1036F TOBACCO NON-USER: CPT | Performed by: FAMILY MEDICINE

## 2021-04-12 PROCEDURE — G8419 CALC BMI OUT NRM PARAM NOF/U: HCPCS | Performed by: FAMILY MEDICINE

## 2021-04-12 PROCEDURE — G8427 DOCREV CUR MEDS BY ELIG CLIN: HCPCS | Performed by: FAMILY MEDICINE

## 2021-04-12 SDOH — ECONOMIC STABILITY: FOOD INSECURITY: WITHIN THE PAST 12 MONTHS, YOU WORRIED THAT YOUR FOOD WOULD RUN OUT BEFORE YOU GOT MONEY TO BUY MORE.: NEVER TRUE

## 2021-04-12 SDOH — ECONOMIC STABILITY: INCOME INSECURITY: HOW HARD IS IT FOR YOU TO PAY FOR THE VERY BASICS LIKE FOOD, HOUSING, MEDICAL CARE, AND HEATING?: NOT HARD AT ALL

## 2021-04-12 SDOH — ECONOMIC STABILITY: TRANSPORTATION INSECURITY
IN THE PAST 12 MONTHS, HAS THE LACK OF TRANSPORTATION KEPT YOU FROM MEDICAL APPOINTMENTS OR FROM GETTING MEDICATIONS?: NO

## 2021-04-12 ASSESSMENT — PATIENT HEALTH QUESTIONNAIRE - PHQ9
SUM OF ALL RESPONSES TO PHQ QUESTIONS 1-9: 0
SUM OF ALL RESPONSES TO PHQ QUESTIONS 1-9: 0
1. LITTLE INTEREST OR PLEASURE IN DOING THINGS: 0

## 2021-04-12 NOTE — PROGRESS NOTES
distress        [] Abnormal-       Neurological:        [x] No Facial Asymmetry (Cranial nerve 7 motor function) (limited exam to video visit)          [] No gaze palsy        [] Abnormal-         Skin:        [x] No significant exanthematous lesions or discoloration noted on facial skin         [] Abnormal-            Psychiatric:       [x] Normal Affect [] No Hallucinations        [] Abnormal-     Other pertinent observable physical exam findings-     ASSESSMENT/PLAN:  1. COVID-19  - symptoms resolved, she may go back to work tomorrow 4/13/21. She will  her work release form tomorrow. 2. History of DVT of lower extremity  - I recommended she see heme-onc for hypercoagulable work up given her unprovoked DVT and family hx.   - she has been following with Dr. Jovani Truong and has finished her six month treatment with Eliquis. - Laura Sanchez MD, Hematology/Oncology, [place]      Return if symptoms worsen or fail to improve. Gilma Proctor, was evaluated through a synchronous (real-time) audio-video encounter. The patient (or guardian if applicable) is aware that this is a billable service. Verbal consent to proceed has been obtained within the past 12 months. The visit was conducted pursuant to the emergency declaration under the [address] [place] [address] authority and the [employer] General Act. Patient identification was verified, and a caregiver was present when appropriate. The patient was located in a state where the provider was credentialed to provide care. Total time spent on this encounter: Not billed by time    --Janis Rosa DO on 4/12/2021 at 4:59 PM    An electronic signature was used to authenticate this note.

## 2021-04-13 ENCOUNTER — TELEPHONE (OUTPATIENT)
Dept: ONCOLOGY | Age: 26
End: 2021-04-13

## 2021-04-13 NOTE — TELEPHONE ENCOUNTER
Received internal referral from [facility],  for pt to see Dr. Hilton Dick for E70.439 (ICD-10-CM) - History of DVT of lower extremity. LM for pt to call back and schedule consult. Referral is in deferred work-q.       Electronically signed by Gerlean Bence on 4/13/2021 at 9:28 AM

## 2021-04-15 ASSESSMENT — ENCOUNTER SYMPTOMS
COLOR CHANGE: 0
CHEST TIGHTNESS: 0
ALLERGIC/IMMUNOLOGIC NEGATIVE: 1
SHORTNESS OF BREATH: 0
ABDOMINAL PAIN: 0

## 2021-05-17 ENCOUNTER — OFFICE VISIT (OUTPATIENT)
Dept: PRIMARY CARE CLINIC | Age: 26
End: 2021-05-17
Payer: COMMERCIAL

## 2021-05-17 VITALS
RESPIRATION RATE: 22 BRPM | HEIGHT: 65 IN | OXYGEN SATURATION: 99 % | HEART RATE: 70 BPM | WEIGHT: 160 LBS | DIASTOLIC BLOOD PRESSURE: 70 MMHG | SYSTOLIC BLOOD PRESSURE: 117 MMHG | BODY MASS INDEX: 26.66 KG/M2

## 2021-05-17 DIAGNOSIS — S82.64XD CLOSED NONDISPLACED FRACTURE OF LATERAL MALLEOLUS OF RIGHT FIBULA WITH ROUTINE HEALING, SUBSEQUENT ENCOUNTER: Primary | ICD-10-CM

## 2021-05-17 PROCEDURE — G8427 DOCREV CUR MEDS BY ELIG CLIN: HCPCS | Performed by: FAMILY MEDICINE

## 2021-05-17 PROCEDURE — 1036F TOBACCO NON-USER: CPT | Performed by: FAMILY MEDICINE

## 2021-05-17 PROCEDURE — 99213 OFFICE O/P EST LOW 20 MIN: CPT | Performed by: FAMILY MEDICINE

## 2021-05-17 PROCEDURE — G8419 CALC BMI OUT NRM PARAM NOF/U: HCPCS | Performed by: FAMILY MEDICINE

## 2021-05-17 RX ORDER — HYDROCODONE BITARTRATE AND ACETAMINOPHEN 5; 325 MG/1; MG/1
1 TABLET ORAL EVERY 6 HOURS PRN
COMMUNITY
Start: 2021-05-15 | End: 2021-05-18

## 2021-05-17 ASSESSMENT — ENCOUNTER SYMPTOMS
SHORTNESS OF BREATH: 0
NAUSEA: 0
SORE THROAT: 0
VOMITING: 0

## 2021-05-17 NOTE — PROGRESS NOTES
[address] PRIMARY CARE  [address]   [address]  [address] [address]  Dept: [phone]  Dept Fax: [phone]    Gilma Proctor is a 30 y.o. female who presents today for her medical conditions/complaints as noted below. Gilma Proctor is c/o of  Chief Complaint   Patient presents with    ED Follow-up     pt fractured R fibia by tripping over a curb. HPI:     HPI     Pt here today for follow up on ankle fracture. States she was walking and stepped down too deep and twisted ankle. Went to ED and found to have undisplaced fracture of the lateral malleolus. She has an appt to see Dr. Ricardo Seo tomorrow. She has been using crutches and they put her leg in splint at ER. She needs note for work. She is able to wiggle her toes, and denies any numbness in her toes.      No results found for: LABA1C          ( goal A1C is < 7)   No results found for: LABMICR  No results found for: LDLCHOLESTEROL, LDLCALC    (goal LDL is <100)   AST (U/L)   Date Value   2012 20     ALT (U/L)   Date Value   2012 14     BUN (mg/dL)   Date Value   2012 6     BP Readings from Last 3 Encounters:   21 117/70   21 106/80   10/09/20 127/87          (goal 120/80)    Past Medical History:   Diagnosis Date    Ultrasound recheck of fetal pyelectasis, antepartum 2012      Past Surgical History:   Procedure Laterality Date    CARPAL TUNNEL RELEASE Right        Family History   Problem Relation Age of Onset    Hypertension Maternal Grandmother     Breast Cancer Neg Hx     Cancer Neg Hx     Colon Cancer Neg Hx     Diabetes Neg Hx     Eclampsia Neg Hx     Ovarian Cancer Neg Hx      Labor Neg Hx     Spont Abortions Neg Hx     Stroke Neg Hx        Social History     Tobacco Use    Smoking status: Never Smoker    Smokeless tobacco: Never Used   Substance Use Topics    Alcohol use: No      Current Outpatient Medications   Medication Sig Dispense Refill    HYDROcodone-acetaminophen (NORCO) 5-325 MG per tablet Take 1 tablet by mouth every 6 hours as needed.  SUMAtriptan (IMITREX) 25 MG tablet Take 1 tablet by mouth once as needed for Migraine May take an additional dose 2 hours after if needed once 9 tablet 3     No current facility-administered medications for this visit. No Known Allergies    Health Maintenance   Topic Date Due    Hepatitis C screen  Never done    Varicella vaccine (1 of 2 - 2-dose childhood series) Never done    COVID-19 Vaccine (1) Never done    Cervical cancer screen  Never done    Flu vaccine (Season Ended) 09/01/2021    DTaP/Tdap/Td vaccine (2 - Td) 06/20/2028    Hepatitis B vaccine  Completed    HIV screen  Completed    Hepatitis A vaccine  Aged Out    Hib vaccine  Aged Out    Meningococcal (ACWY) vaccine  Aged Out    Pneumococcal 0-64 years Vaccine  Aged Out       Subjective:      Review of Systems   Constitutional: Negative for chills and fever. HENT: Negative for sore throat. Respiratory: Negative for shortness of breath. Cardiovascular: Negative for chest pain. Gastrointestinal: Negative for nausea and vomiting. Musculoskeletal: Positive for arthralgias (R ankle pain). Skin: Negative for rash. Objective:     Physical Exam  Constitutional:       Appearance: She is well-developed. HENT:      Head: Normocephalic and atraumatic. Eyes:      Conjunctiva/sclera: Conjunctivae normal.      Pupils: Pupils are equal, round, and reactive to light. Cardiovascular:      Rate and Rhythm: Normal rate and regular rhythm. Heart sounds: Normal heart sounds. Pulmonary:      Effort: Pulmonary effort is normal.      Breath sounds: Normal breath sounds. Musculoskeletal:      Cervical back: Neck supple. Comments: R foot in splint, wrapped with ace wrap. Pt able to wiggle toes, denies any numbness on exam of toes. Skin:     General: Skin is warm and dry.    Neurological:      Mental Status: She is alert and oriented to person, place, and time. Psychiatric:         Mood and Affect: Mood normal.         Behavior: Behavior normal.         Thought Content: Thought content normal.       /70 (Site: Left Upper Arm, Position: Sitting, Cuff Size: Medium Adult)   Pulse 70   Resp 22   Ht 5' 5\" (1.651 m)   Wt 160 lb (72.6 kg)   SpO2 99%   BMI 26.63 kg/m²     Assessment:      1. Closed nondisplaced fracture of lateral malleolus of right fibula with routine healing, subsequent encounter  - needs note for work, note written for rest of week off. She will also be seeing Orthopedics tomorrow as well. Pt states pain has been tolerable, has been taking ibuprofen. I also encouraged pt to call and make appt with heme onc as well for her hx of DVT. Plan:      Return if symptoms worsen or fail to improve. No orders of the defined types were placed in this encounter. No orders of the defined types were placed in this encounter. Patient given educational materials - see patient instructions. Discussed use, benefit, and side effects of prescribed medications. All patient questions answered. Pt voiced understanding. Reviewed healthmaintenance. Instructed to continue current medications, diet and exercise. Patient agreed with treatment plan. Follow up as directed.      Electronically signed by Amilcar Gonzales DO on 5/17/2021 at 1:58 PM

## 2021-05-17 NOTE — LETTER
[facility]  [address]  [facility] [address]  Phone: [phone]  Fax: [phone] Sheridan DO        May 17, 2021     Patient: Sam Hermosillo   YOB: 1995   Date of Visit: 5/17/2021       To Whom it May Concern:    Cruz Charli was seen in my clinic on 5/17/2021. Please excuse her from work 5/17- 5/23. She may return to work 5/24/21 unless otherwise specified by orthopedic doctor. If you have any questions or concerns, please don't hesitate to call.     Sincerely,       Cook-Muñoz Squibb, DO

## 2021-05-21 ENCOUNTER — TELEPHONE (OUTPATIENT)
Dept: ONCOLOGY | Age: 26
End: 2021-05-21

## 2021-05-21 ENCOUNTER — INITIAL CONSULT (OUTPATIENT)
Dept: ONCOLOGY | Age: 26
End: 2021-05-21
Payer: COMMERCIAL

## 2021-05-21 VITALS
HEART RATE: 89 BPM | TEMPERATURE: 98.3 F | BODY MASS INDEX: 27.61 KG/M2 | HEIGHT: 65 IN | DIASTOLIC BLOOD PRESSURE: 78 MMHG | WEIGHT: 165.7 LBS | RESPIRATION RATE: 16 BRPM | SYSTOLIC BLOOD PRESSURE: 123 MMHG

## 2021-05-21 DIAGNOSIS — D68.59 HYPERCOAGULABLE STATE (HCC): Primary | ICD-10-CM

## 2021-05-21 PROCEDURE — 99202 OFFICE O/P NEW SF 15 MIN: CPT | Performed by: INTERNAL MEDICINE

## 2021-05-21 PROCEDURE — G8427 DOCREV CUR MEDS BY ELIG CLIN: HCPCS | Performed by: INTERNAL MEDICINE

## 2021-05-21 PROCEDURE — G8419 CALC BMI OUT NRM PARAM NOF/U: HCPCS | Performed by: INTERNAL MEDICINE

## 2021-05-21 PROCEDURE — 99244 OFF/OP CNSLTJ NEW/EST MOD 40: CPT | Performed by: INTERNAL MEDICINE

## 2021-05-21 NOTE — LETTER
_    Radha Bishop MD    5/21/2021     Jordana Magana, [address]  [address] [facility]    Dear Dr Zari Chavez: Thank you for referring Anna Swann, 1995, to me for evaluation. Below are the relevant portions of my assessment and plan of care. Ms. Anna Swann is a very pleasant 30 y.o. female with no major chronic health problems. Patient is referred for evaluation of history of left leg DVT and probable hypercoagulability. Basically patient did not have any significant health problems in the past.  She developed left leg pain and swelling in August 2020. Further evaluation with Doppler ultrasound revealed left leg DVT for which she was treated with Eliquis. Anticoagulation was discontinued in April 2021. Patient did not have any residual pain. She had no shortness of breath. No chest pain. No hemoptysis. She has no history of DVT or PE in the past.  This episode of DVT was not preceded by any sickness or immobilization. No trauma. No history of miscarriages. Patient had 1 live child who is 9years old. Patient's father had history of DVT and pulmonary embolism and stroke. No confirmed history of hypercoagulability although hypercoagulable state was mentioned on his death certificate. No other events of thrombosis or embolization in the family. No history of smoking or alcohol drinking. Samy  PAST MEDICAL HISTORY: has a past medical history of Ultrasound recheck of fetal pyelectasis, antepartum. PAST SURGICAL HISTORY: has a past surgical history that includes Carpal tunnel release (Right). CURRENT MEDICATIONS:  has a current medication list which includes the following prescription(s): sumatriptan. ALLERGIES:  has No Known Allergies. FAMILY HISTORY: Negative for any hematological or oncological conditions. SOCIAL HISTORY:  reports that she has never smoked.  She has never used smokeless tobacco. She reports that she does not drink alcohol and does not use drugs. REVIEW OF SYSTEMS:     · General: No weakness or fatigue. No unanticipated weight loss or decreased appetite. No fever or chills. · Eyes: No blurred vision, eye pain or double vision. · Ears: No hearing problems or drainage. No tinnitus. · Throat: No sore throat, problems with swallowing or dysphagia. · Respiratory: No cough, sputum or hemoptysis. No shortness of breath. No pleuritic chest pain. · Cardiovascular: No chest pain, orthopnea or PND. No lower extremity edema. No palpitation. · Gastrointestinal: No problems with swallowing. No abdominal pain or bloating. No nausea or vomiting. No diarrhea or constipation. No GI bleeding. · Genitourinary: No dysuria, hematuria, frequency or urgency. · Musculoskeletal: No muscle aches or pains. No limitation of movement. No back pain. No gait disturbance, No joint complaints. · Dermatologic: No skin rashes or pruritus. No skin lesions or discolorations. · Psychiatric: No depression, anxiety, or stress or signs of schizophrenia. No change in mood or affect. · Hematologic: No history of bleeding tendency. No bruises or ecchymosis. No history of clotting problems. · Infectious disease: No fever, chills or frequent infections. · Endocrine: No polydipsia or polyuria. No temperature intolerance. · Neurologic: No headaches or dizziness. No weakness or numbness of the extremities. No changes in balance, coordination,  memory, mentation, behavior. · Allergic/Immunologic: No nasal congestion or hives. No repeated infections. PHYSICAL EXAM:  The patient is not in acute distress. Vital signs: Blood pressure 123/78, pulse 89, temperature 98.3 °F (36.8 °C), temperature source Oral, resp. rate 16, height 5' 5\" (1.651 m), weight 165 lb 11.2 oz (75.2 kg), currently breastfeeding.      General appearance - well appearing, not in pain or distress  Mental status - good mood, alert and oriented  Eyes - pupils equal and reactive, extraocular eye movements intact  Ears - bilateral TM's and external ear canals normal  Nose - normal and patent, no erythema, discharge or polyps  Mouth - mucous membranes moist, pharynx normal without lesions  Neck - supple, no significant adenopathy  Lymphatics - no palpable lymphadenopathy, no hepatosplenomegaly  Chest - clear to auscultation, no wheezes, rales or rhonchi, symmetric air entry  Heart - normal rate, regular rhythm, normal S1, S2, no murmurs, rubs, clicks or gallops  Abdomen - soft, nontender, nondistended, no masses or organomegaly  Neurological - alert, oriented, normal speech, no focal findings or movement disorder noted  Musculoskeletal - no joint tenderness, deformity or swelling  Extremities - peripheral pulses normal, no pedal edema, no clubbing or cyanosis  Skin - normal coloration and turgor, no rashes, no suspicious skin lesions noted     Review of Diagnostic data:   Lab Results   Component Value Date    WBC 13.3 10/26/2012    HGB 11.9 (L) 10/28/2012    HCT 34.5 (L) 10/28/2012    MCV 92.8 10/26/2012     10/26/2012       Chemistry        Component Value Date/Time     06/09/2012 0556    K 3.8 06/09/2012 0556     06/09/2012 0556    CO2 24 06/09/2012 0556    BUN 6 06/09/2012 0556    CREATININE 0.55 06/09/2012 0556        Component Value Date/Time    CALCIUM 8.6 06/09/2012 0556    ALKPHOS 43 06/08/2012 1540    AST 20 06/08/2012 1540    ALT 14 06/08/2012 1540    BILITOT 0.52 06/08/2012 1540            IMPRESSION:   Unprovoked left leg DVT August 2020. Status post treatment with 8 months of Eliquis discontinued April 2021. Patient's father with history of DVT and PE and strokes. Possible hypercoagulable state. PLAN: For more than 40 minutes of face to face discussion, I explained to the patient the nature of this problem of DVT and clotting and possibility of hypercoagulability. Obviously patient developed left leg DVT with no provoking factors.   She was not on any birth control pills. She had questionable history of clotting and thrombosis with her father. So I believe hypercoagulable work-up is indicated to rule out underlying abnormalities. Splane the significance of these tests and the clinical implications. Patient agreed. We will do hypercoagulable work-up and we will call her with the results. In the interim patient was educated about acquired risk factors for thrombosis to be avoided. Patient's questions were answered to the best of her satisfaction and she verbalized full understanding and agreement. If you have questions, please do not hesitate to call me. I look forward to following Dionna Bee along with you. Sincerely,                            [facility]                            This note is created with the assistance of a speech recognition program.  While intending to generate a document that actually reflects the content of the visit, the document can still have some errors including those of syntax and sound a like substitutions which may escape proof reading. It such instances, actual meaning can be extrapolated by contextual diversion.

## 2021-05-21 NOTE — PROGRESS NOTES
_               Ms. Nik Gaitan is a very pleasant 30 y.o. female with no major chronic health problems. Patient is referred for evaluation of history of left leg DVT and probable hypercoagulability. Basically patient did not have any significant health problems in the past.  She developed left leg pain and swelling in August 2020. Further evaluation with Doppler ultrasound revealed left leg DVT for which she was treated with Eliquis. Anticoagulation was discontinued in April 2021. Patient did not have any residual pain. She had no shortness of breath. No chest pain. No hemoptysis. She has no history of DVT or PE in the past.  This episode of DVT was not preceded by any sickness or immobilization. No trauma. No history of miscarriages. Patient had 1 live child who is 9years old. Patient's father had history of DVT and pulmonary embolism and stroke. No confirmed history of hypercoagulability although hypercoagulable state was mentioned on his death certificate. No other events of thrombosis or embolization in the family. No history of smoking or alcohol drinking. Sylvia Garcia PAST MEDICAL HISTORY: has a past medical history of Ultrasound recheck of fetal pyelectasis, antepartum. PAST SURGICAL HISTORY: has a past surgical history that includes Carpal tunnel release (Right). CURRENT MEDICATIONS:  has a current medication list which includes the following prescription(s): sumatriptan. ALLERGIES:  has No Known Allergies. FAMILY HISTORY: Negative for any hematological or oncological conditions. SOCIAL HISTORY:  reports that she has never smoked. She has never used smokeless tobacco. She reports that she does not drink alcohol and does not use drugs. REVIEW OF SYSTEMS:     · General: No weakness or fatigue. No unanticipated weight loss or decreased appetite. No fever or chills.    · Eyes: No blurred vision, eye pain or double vision. · Ears: No hearing problems or drainage. No tinnitus. · Throat: No sore throat, problems with swallowing or dysphagia. · Respiratory: No cough, sputum or hemoptysis. No shortness of breath. No pleuritic chest pain. · Cardiovascular: No chest pain, orthopnea or PND. No lower extremity edema. No palpitation. · Gastrointestinal: No problems with swallowing. No abdominal pain or bloating. No nausea or vomiting. No diarrhea or constipation. No GI bleeding. · Genitourinary: No dysuria, hematuria, frequency or urgency. · Musculoskeletal: No muscle aches or pains. No limitation of movement. No back pain. No gait disturbance, No joint complaints. · Dermatologic: No skin rashes or pruritus. No skin lesions or discolorations. · Psychiatric: No depression, anxiety, or stress or signs of schizophrenia. No change in mood or affect. · Hematologic: No history of bleeding tendency. No bruises or ecchymosis. No history of clotting problems. · Infectious disease: No fever, chills or frequent infections. · Endocrine: No polydipsia or polyuria. No temperature intolerance. · Neurologic: No headaches or dizziness. No weakness or numbness of the extremities. No changes in balance, coordination,  memory, mentation, behavior. · Allergic/Immunologic: No nasal congestion or hives. No repeated infections. PHYSICAL EXAM:  The patient is not in acute distress. Vital signs: Blood pressure 123/78, pulse 89, temperature 98.3 °F (36.8 °C), temperature source Oral, resp. rate 16, height 5' 5\" (1.651 m), weight 165 lb 11.2 oz (75.2 kg), currently breastfeeding.      General appearance - well appearing, not in pain or distress  Mental status - good mood, alert and oriented  Eyes - pupils equal and reactive, extraocular eye movements intact  Ears - bilateral TM's and external ear canals normal  Nose - normal and patent, no erythema, discharge or polyps  Mouth - mucous membranes moist, pharynx normal without lesions  Neck - supple, no significant adenopathy  Lymphatics - no palpable lymphadenopathy, no hepatosplenomegaly  Chest - clear to auscultation, no wheezes, rales or rhonchi, symmetric air entry  Heart - normal rate, regular rhythm, normal S1, S2, no murmurs, rubs, clicks or gallops  Abdomen - soft, nontender, nondistended, no masses or organomegaly  Neurological - alert, oriented, normal speech, no focal findings or movement disorder noted  Musculoskeletal - no joint tenderness, deformity or swelling  Extremities - peripheral pulses normal, no pedal edema, no clubbing or cyanosis  Skin - normal coloration and turgor, no rashes, no suspicious skin lesions noted     Review of Diagnostic data:   Lab Results   Component Value Date    WBC 13.3 10/26/2012    HGB 11.9 (L) 10/28/2012    HCT 34.5 (L) 10/28/2012    MCV 92.8 10/26/2012     10/26/2012       Chemistry        Component Value Date/Time     06/09/2012 0556    K 3.8 06/09/2012 0556     06/09/2012 0556    CO2 24 06/09/2012 0556    BUN 6 06/09/2012 0556    CREATININE 0.55 06/09/2012 0556        Component Value Date/Time    CALCIUM 8.6 06/09/2012 0556    ALKPHOS 43 06/08/2012 1540    AST 20 06/08/2012 1540    ALT 14 06/08/2012 1540    BILITOT 0.52 06/08/2012 1540            IMPRESSION:   Unprovoked left leg DVT August 2020. Status post treatment with 8 months of Eliquis discontinued April 2021. Patient's father with history of DVT and PE and strokes. Possible hypercoagulable state. PLAN: For more than 40 minutes of face to face discussion, I explained to the patient the nature of this problem of DVT and clotting and possibility of hypercoagulability. Obviously patient developed left leg DVT with no provoking factors. She was not on any birth control pills. She had questionable history of clotting and thrombosis with her father. So I believe hypercoagulable work-up is indicated to rule out underlying abnormalities.   Splane the significance of these tests and the clinical implications. Patient agreed. We will do hypercoagulable work-up and we will call her with the results. In the interim patient was educated about acquired risk factors for thrombosis to be avoided. Patient's questions were answered to the best of her satisfaction and she verbalized full understanding and agreement.

## 2021-05-22 ENCOUNTER — HOSPITAL ENCOUNTER (OUTPATIENT)
Age: 26
Discharge: HOME | End: 2021-05-22
Payer: COMMERCIAL

## 2021-05-22 DIAGNOSIS — D68.59 HYPERCOAGULABLE STATE (HCC): ICD-10-CM

## 2021-05-22 PROCEDURE — 86147 CARDIOLIPIN ANTIBODY EA IG: CPT

## 2021-05-22 PROCEDURE — 85306 CLOT INHIBIT PROT S FREE: CPT

## 2021-05-22 PROCEDURE — 81291 MTHFR GENE: CPT

## 2021-05-22 PROCEDURE — 36415 COLL VENOUS BLD VENIPUNCTURE: CPT

## 2021-05-22 PROCEDURE — 83090 ASSAY OF HOMOCYSTEINE: CPT

## 2021-05-22 PROCEDURE — 85301 ANTITHROMBIN III ANTIGEN: CPT

## 2021-05-22 PROCEDURE — 81241 F5 GENE: CPT

## 2021-05-22 PROCEDURE — 81240 F2 GENE: CPT

## 2021-05-22 PROCEDURE — 85303 CLOT INHIBIT PROT C ACTIVITY: CPT

## 2021-05-23 LAB — HOMOCYSTEINE: 7.7 UMOL/L

## 2021-05-24 LAB
PROTEIN C ACTIVITY: 77 %
PROTEIN S ACTIVITY: 75 % (ref 59–130)

## 2021-05-25 LAB — ANTI-THROMBIN 3 AG: 84 % (ref 82–136)

## 2021-05-28 LAB
MTHFR INTERPRETATION: NORMAL
MTHFR MUTATION A1286C: NEGATIVE
MTHFR MUTATION C665T: NORMAL
SPECIMEN: NORMAL

## 2021-05-29 LAB
FACTOR V MUTATION: NEGATIVE
SPECIMEN: NORMAL

## 2021-05-30 LAB
PROTHROMBIN G20210A MUTATION: NEGATIVE
PT PCR SPECIMEN: NORMAL

## 2021-06-02 LAB
ANTICARDIOLIPIN IGA ANTIBODY: 2.7 APL (ref 0–14)
ANTICARDIOLIPIN IGG ANTIBODY: 0.9 GPL (ref 0–10)
CARDIOLIPIN AB IGM: 2 MPL (ref 0–10)

## 2022-10-17 ENCOUNTER — OFFICE VISIT (OUTPATIENT)
Dept: OBGYN CLINIC | Age: 27
End: 2022-10-17

## 2022-10-17 ENCOUNTER — HOSPITAL ENCOUNTER (OUTPATIENT)
Age: 27
Setting detail: SPECIMEN
Discharge: HOME | End: 2022-10-17
Payer: MEDICAID

## 2022-10-17 VITALS — DIASTOLIC BLOOD PRESSURE: 70 MMHG | SYSTOLIC BLOOD PRESSURE: 114 MMHG | BODY MASS INDEX: 26.74 KG/M2 | HEIGHT: 66 IN

## 2022-10-17 DIAGNOSIS — N92.6 MISSED MENSES: Primary | ICD-10-CM

## 2022-10-17 DIAGNOSIS — N92.6 MISSED MENSES: ICD-10-CM

## 2022-10-17 LAB
CONTROL: NORMAL
HCG QUANTITATIVE: 5850 MIU/ML
PREGNANCY TEST URINE, POC: NEGATIVE

## 2022-10-17 PROCEDURE — 99203 OFFICE O/P NEW LOW 30 MIN: CPT | Performed by: NURSE PRACTITIONER

## 2022-10-17 PROCEDURE — 81025 URINE PREGNANCY TEST: CPT | Performed by: NURSE PRACTITIONER

## 2022-10-17 PROCEDURE — 36415 COLL VENOUS BLD VENIPUNCTURE: CPT

## 2022-10-17 PROCEDURE — 84702 CHORIONIC GONADOTROPIN TEST: CPT

## 2022-10-17 NOTE — PROGRESS NOTES
[address]  10/17/2022    YOB: 1995          The patient was seen today. She is here regarding missed menses. LMP 2022. + PNVs. . Denies concerns . Her bowels are regular and she is voiding without difficulty.      HPI:  [address] is a 32 y.o. female  missed menses       OB History    Para Term  AB Living   1 1 1 0 0 1   SAB IAB Ectopic Molar Multiple Live Births   0 0 0 0 0 1      # Outcome Date GA Lbr Tej/2nd Weight Sex Delivery Anes PTL Lv   1 Term 10/27/12 40w0d  8 lb 4 oz (3.742 kg) M Vag-Spont   MARLENY       Past Medical History:   Diagnosis Date    Ultrasound recheck of fetal pyelectasis, antepartum 2012       Past Surgical History:   Procedure Laterality Date    CARPAL TUNNEL RELEASE Right        Family History   Problem Relation Age of Onset    Hypertension Maternal Grandmother     Breast Cancer Neg Hx     Cancer Neg Hx     Colon Cancer Neg Hx     Diabetes Neg Hx     Eclampsia Neg Hx     Ovarian Cancer Neg Hx      Labor Neg Hx     Spont Abortions Neg Hx     Stroke Neg Hx        Social History     Socioeconomic History    Marital status: Legally      Spouse name: Not on file    Number of children: Not on file    Years of education: Not on file    Highest education level: Not on file   Occupational History    Not on file   Tobacco Use    Smoking status: Never    Smokeless tobacco: Never   Vaping Use    Vaping Use: Every day    Substances: Nicotine   Substance and Sexual Activity    Alcohol use: No    Drug use: No    Sexual activity: Yes     Partners: Male   Other Topics Concern    Not on file   Social History Narrative    Not on file     Social Determinants of Health     Financial Resource Strain: Not on file   Food Insecurity: Not on file   Transportation Needs: Not on file   Physical Activity: Not on file   Stress: Not on file   Social Connections: Not on file   Intimate Partner Violence: Not on file   Housing Stability: Not on file MEDICATIONS:  Current Outpatient Medications   Medication Sig Dispense Refill    SUMAtriptan (IMITREX) 25 MG tablet Take 1 tablet by mouth once as needed for Migraine May take an additional dose 2 hours after if needed once 9 tablet 3     No current facility-administered medications for this visit. ALLERGIES:  Allergies as of 10/17/2022    (No Known Allergies)         REVIEW OF SYSTEMS:    see problem list   A minimum of an eleven point review of systems was completed. Review Of Systems (11 point):  Constitutional: No fever, chills or malaise; No weight change or fatigue  Head and Eyes: No vision, Headache, Dizziness or trauma in last 12 months  ENT ROS: No hearing, Tinnitis, sinus or taste problems  Hematological and Lymphatic ROS:No Lymphoma, Von Willebrand's, Hemophillia or Bleeding History  Psych ROS: No Depression, Homicidal thoughts,suicidal thoughts, or anxiety  Breast ROS: No prior breast abnormalities or lumps  Respiratory ROS: No SOB, Pneumoniae,Cough, or Pulmonary Embolism History  Cardiovascular ROS: No Chest Pain with Exertion, Palpitations, Syncope, Edema, Arrhythmia  Gastrointestinal ROS: No Indigestion, Heartburn, Nausea, vomiting, Diarrhea, Constipation,or Bowel Changes; No Bloody Stools or melena  Genito-Urinary ROS: No Dysuria, Hematuria or Nocturia. No Urinary Incontinence or Vaginal Discharge  Musculoskeletal ROS: No Arthralgia, Arthritis,Gout,Osteoporosis or Rheumatism  Neurological ROS: No CVA, Migraines, Epilepsy, Seizure Hx, or Limb Weakness  Dermatological ROS: No Rash, Itching, Hives, Mole Changes or Cancer          Blood pressure 114/70, height 5' 6\" (1.676 m), last menstrual period 09/01/2022, not currently breastfeeding. Chaperone for Intimate Exam  Chaperone was offered and accepted as part of the rooming process. Chaperone: Leticia Llamas MA         Abdomen: Soft non-tender; good bowel sounds. No guarding, rebound or rigidity.  No CVA tenderness bilaterally. Extremities: No calf tenderness, DTR 2/4, and No edema bilaterally    Pelvic: Exam deferred. Diagnostics:  No results found. Lab Results:  Results for orders placed or performed in visit on 10/17/22   POCT urine pregnancy   Result Value Ref Range    Preg Test, Ur negative     Control done          Assessment:   Diagnosis Orders   1. Missed menses  POCT urine pregnancy    HCG, Quantitative, Pregnancy        Patient Active Problem List    Diagnosis Date Noted    Hypercoagulable state ([facility] [address] 05/21/2021    Carpal tunnel syndrome on right 08/28/2018           PLAN:  Return in about 2 weeks (around 10/31/2022) for intake . Lab slip given  Continue PNVs  Repeat Annual every 1 year  Cervical Cytology Evaluation begins at 24years old. If Negative Cytology, Follow-up screening per current guidelines. Return to the office in 2 weeks. Counseled on preventative health maintenance follow-up. Orders Placed This Encounter   Procedures    HCG, Quantitative, Pregnancy     Standing Status:   Future     Standing Expiration Date:   10/17/2023    POCT urine pregnancy           The patient, Jeannine Clinton is a 32 y.o. female, was seen with a total time spent of 30 minutes for the visit on this date of service by the E/M provider. The time component had both face to face and non face to face time spent in determining the total time component. Counseling and education regarding her diagnosis listed below and her options regarding those diagnoses were also included in determining her time component. Diagnosis Orders   1. Missed menses  POCT urine pregnancy    HCG, Quantitative, Pregnancy           The patient had her preventative health maintenance recommendations and follow-up reviewed with her at the completion of her visit.

## 2022-10-18 ENCOUNTER — TELEPHONE (OUTPATIENT)
Dept: OBGYN CLINIC | Age: 27
End: 2022-10-18

## 2022-10-18 PROBLEM — Z15.89 MTHFR MUTATION: Status: ACTIVE | Noted: 2022-10-18

## 2022-10-18 PROBLEM — Z86.718 HX OF DEEP VENOUS THROMBOSIS: Status: ACTIVE | Noted: 2022-10-18

## 2022-10-18 NOTE — TELEPHONE ENCOUNTER
----- Message from JUSTIN Camacho CNP sent at 10/18/2022  8:01 AM EDT -----  +quant HCG  +MTHFR  Folgard 1 tab po QD with 12 refills. Prenatal vitamin 1 Po qD with 12 refills.   Please schedule new ob intake/ ultrasound

## 2022-10-18 NOTE — TELEPHONE ENCOUNTER
----- Message from JUSTIN Gardner CNP sent at 10/18/2022  8:01 AM EDT -----  +quant HCG  +MTHFR  Folgard 1 tab po QD with 12 refills. Prenatal vitamin 1 Po qD with 12 refills.   Please schedule new ob intake/ ultrasound

## 2022-10-18 NOTE — TELEPHONE ENCOUNTER
LM for pt to call office regarding +quant HCG  +MTHFR and pt will need   Folgard 1 tab po QD with 12 refills. Prenatal vitamin 1 Po qD with 12 refills.   Please schedule new ob intake/ ultrasound

## 2022-10-18 NOTE — TELEPHONE ENCOUNTER
Per Minh Suarez pt notified of +quant HCG and pt with +MTHFR. Pt to get Folgard 1 tab po QD with 12 refills and per pt she is currently taking PNV. Pt scheduled for new ob intake/ ultrasound.  Pt LMP 9/1/22

## 2022-11-10 DIAGNOSIS — Z34.90 EARLY STAGE OF PREGNANCY: Primary | ICD-10-CM

## 2022-11-14 ENCOUNTER — PROCEDURE VISIT (OUTPATIENT)
Dept: OBGYN CLINIC | Age: 27
End: 2022-11-14
Payer: COMMERCIAL

## 2022-11-14 ENCOUNTER — INITIAL PRENATAL (OUTPATIENT)
Dept: OBGYN CLINIC | Age: 27
End: 2022-11-14

## 2022-11-14 VITALS
HEART RATE: 69 BPM | BODY MASS INDEX: 23.73 KG/M2 | WEIGHT: 147 LBS | DIASTOLIC BLOOD PRESSURE: 66 MMHG | SYSTOLIC BLOOD PRESSURE: 114 MMHG

## 2022-11-14 DIAGNOSIS — Z15.89 MTHFR MUTATION: ICD-10-CM

## 2022-11-14 DIAGNOSIS — Z34.90 EARLY STAGE OF PREGNANCY: ICD-10-CM

## 2022-11-14 DIAGNOSIS — Z3A.09 9 WEEKS GESTATION OF PREGNANCY: ICD-10-CM

## 2022-11-14 DIAGNOSIS — Z34.90 EARLY STAGE OF PREGNANCY: Primary | ICD-10-CM

## 2022-11-14 DIAGNOSIS — O09.91 HIGH-RISK PREGNANCY IN FIRST TRIMESTER: ICD-10-CM

## 2022-11-14 DIAGNOSIS — Z82.79 FAMILY HISTORY OF CONGENITAL HEART DEFECT: ICD-10-CM

## 2022-11-14 DIAGNOSIS — Z86.718 HX OF DEEP VENOUS THROMBOSIS: Primary | ICD-10-CM

## 2022-11-14 PROBLEM — O46.8X9 SUBCHORIONIC HEMATOMA, ANTEPARTUM: Status: ACTIVE | Noted: 2022-11-14

## 2022-11-14 PROBLEM — O41.8X90 SUBCHORIONIC HEMATOMA, ANTEPARTUM: Status: ACTIVE | Noted: 2022-11-14

## 2022-11-14 LAB
CRL: NORMAL
SAC DIAMETER: NORMAL

## 2022-11-14 PROCEDURE — 0501F PRENATAL FLOW SHEET: CPT | Performed by: NURSE PRACTITIONER

## 2022-11-14 PROCEDURE — 76801 OB US < 14 WKS SINGLE FETUS: CPT | Performed by: OBSTETRICS & GYNECOLOGY

## 2022-11-14 RX ORDER — FOLIC ACID-PYRIDOXINE-CYANOCOBALAMIN TAB 2.5-25-2 MG 2.5-25-2 MG
TAB ORAL
COMMUNITY
Start: 2022-10-21

## 2022-11-14 RX ORDER — PYRIDOXINE HCL (VITAMIN B6) 50 MG
1 TABLET ORAL DAILY
Qty: 60 TABLET | Refills: 2 | Status: SHIPPED | OUTPATIENT
Start: 2022-11-14

## 2022-11-14 RX ORDER — ASCORBIC ACID, CHOLECALCIFEROL, .ALPHA.-TOCOPHEROL ACETATE, DL-, PYRIDOXINE, FOLIC ACID, CYANOCOBALAMIN, CALCIUM, FERROUS FUMARATE, MAGNESIUM, DOCONEXENT 85; 200; 10; 25; 1; 12; 140; 27; 45; 300 [IU]/1; [IU]/1; [IU]/1; [IU]/1; MG/1; UG/1; MG/1; MG/1; MG/1; MG/1
1 CAPSULE, GELATIN COATED ORAL
COMMUNITY

## 2022-11-14 NOTE — RESULT ENCOUNTER NOTE
Pt notified at intake appointment of restrictions for subchorionic hematoma. Pt scheduled for [facility] 1st tri screen.

## 2022-11-14 NOTE — PROGRESS NOTES
Patient presents to office for OB intake, nurse visit only. Intake completed following ultrasound in office to confirm pregnancy dating/viability. Based on ultrasound, patient is currently 9w1d  gestation, Estimated Date of Delivery: 6/18/23. Patient presents to intake FOB. This was a planned pregnancy. Patient currently taking has a current medication list which includes the following prescription(s): pnv-dha, b-6, folic acid-pyridoxine-cyanocobalamine, and folbic. Patient's medical, surgical, obstetrical and family history reviewed. See EHR for details. Patient prenatal lab work given to patient at this visit as well as OB education material. New OB consent forms signed. Patient accepted first trimester screening. Cystic Fibrosis screening previously done 4/13/12. Referral placed to [facility] for first trimester screen, hx MTHFR/DVT, and  Pt with maternal cousin with CHD. Pt with subchorionic bleed; pelvic rest and lifting restrictions reviewed/ verbalized understanding. Patient scheduled for follow up OB appointment with Miranda Bauer NP on 12/7/22. Patient instructed to complete lab work prior to follow up OB appointment.

## 2022-12-01 ENCOUNTER — HOSPITAL ENCOUNTER (OUTPATIENT)
Age: 27
End: 2022-12-01
Payer: COMMERCIAL

## 2022-12-01 ENCOUNTER — HOSPITAL ENCOUNTER (OUTPATIENT)
Age: 27
Discharge: HOME | End: 2022-12-01
Payer: COMMERCIAL

## 2022-12-01 DIAGNOSIS — Z34.90 EARLY STAGE OF PREGNANCY: ICD-10-CM

## 2022-12-01 DIAGNOSIS — Z3A.09 9 WEEKS GESTATION OF PREGNANCY: ICD-10-CM

## 2022-12-01 LAB
ABO/RH: NORMAL
ABSOLUTE EOS #: 0.3 K/UL (ref 0–0.4)
ABSOLUTE LYMPH #: 1.3 K/UL (ref 1–4.8)
ABSOLUTE MONO #: 0.6 K/UL (ref 0.1–1.3)
ANTIBODY SCREEN: NEGATIVE
BASOPHILS # BLD: 0 % (ref 0–2)
BASOPHILS ABSOLUTE: 0 K/UL (ref 0–0.2)
BILIRUBIN URINE: NEGATIVE
COLOR: YELLOW
COMMENT UA: ABNORMAL
EOSINOPHILS RELATIVE PERCENT: 3 % (ref 0–4)
GLUCOSE BLD-MCNC: 79 MG/DL (ref 70–99)
GLUCOSE URINE: NEGATIVE
HCT VFR BLD CALC: 39 % (ref 36–46)
HEMOGLOBIN: 13.4 G/DL (ref 12–16)
HEPATITIS B SURFACE ANTIGEN: NONREACTIVE
HIV AG/AB: NONREACTIVE
KETONES, URINE: ABNORMAL
LEUKOCYTE ESTERASE, URINE: NEGATIVE
LYMPHOCYTES # BLD: 12 % (ref 24–44)
MCH RBC QN AUTO: 30.9 PG (ref 26–34)
MCHC RBC AUTO-ENTMCNC: 34.5 G/DL (ref 31–37)
MCV RBC AUTO: 89.5 FL (ref 80–100)
MONOCYTES # BLD: 6 % (ref 1–7)
NITRITE, URINE: NEGATIVE
PDW BLD-RTO: 12.8 % (ref 11.5–14.9)
PH UA: 5.5 (ref 5–8)
PLATELET # BLD: 190 K/UL (ref 150–450)
PMV BLD AUTO: 8.8 FL (ref 6–12)
PROTEIN UA: NEGATIVE
RBC # BLD: 4.36 M/UL (ref 4–5.2)
RUBV IGG SER QL: 122.3 IU/ML
SEG NEUTROPHILS: 79 % (ref 36–66)
SEGMENTED NEUTROPHILS ABSOLUTE COUNT: 8.7 K/UL (ref 1.3–9.1)
SPECIFIC GRAVITY UA: 1.02 (ref 1–1.03)
T. PALLIDUM, IGG: NONREACTIVE
T. PALLIDUM, IGG: NONREACTIVE
TSH SERPL DL<=0.05 MIU/L-ACNC: 0.91 UIU/ML (ref 0.3–5)
TURBIDITY: CLEAR
URINE HGB: NEGATIVE
UROBILINOGEN, URINE: NORMAL
WBC # BLD: 11 K/UL (ref 3.5–11)

## 2022-12-01 PROCEDURE — 86901 BLOOD TYPING SEROLOGIC RH(D): CPT

## 2022-12-01 PROCEDURE — 86850 RBC ANTIBODY SCREEN: CPT

## 2022-12-01 PROCEDURE — 86762 RUBELLA ANTIBODY: CPT

## 2022-12-01 PROCEDURE — 82947 ASSAY GLUCOSE BLOOD QUANT: CPT

## 2022-12-01 PROCEDURE — 85025 COMPLETE CBC W/AUTO DIFF WBC: CPT

## 2022-12-01 PROCEDURE — 84443 ASSAY THYROID STIM HORMONE: CPT

## 2022-12-01 PROCEDURE — 86780 TREPONEMA PALLIDUM: CPT

## 2022-12-01 PROCEDURE — 36415 COLL VENOUS BLD VENIPUNCTURE: CPT

## 2022-12-01 PROCEDURE — 87340 HEPATITIS B SURFACE AG IA: CPT

## 2022-12-01 PROCEDURE — 86900 BLOOD TYPING SEROLOGIC ABO: CPT

## 2022-12-01 PROCEDURE — 81003 URINALYSIS AUTO W/O SCOPE: CPT

## 2022-12-01 PROCEDURE — 87389 HIV-1 AG W/HIV-1&-2 AB AG IA: CPT

## 2022-12-07 ENCOUNTER — ROUTINE PRENATAL (OUTPATIENT)
Dept: OBGYN CLINIC | Age: 27
End: 2022-12-07

## 2022-12-07 ENCOUNTER — TELEPHONE (OUTPATIENT)
Dept: ONCOLOGY | Age: 27
End: 2022-12-07

## 2022-12-07 ENCOUNTER — HOSPITAL ENCOUNTER (OUTPATIENT)
Age: 27
Setting detail: SPECIMEN
Discharge: HOME | End: 2022-12-07

## 2022-12-07 VITALS
DIASTOLIC BLOOD PRESSURE: 78 MMHG | HEIGHT: 66 IN | BODY MASS INDEX: 23.46 KG/M2 | WEIGHT: 146 LBS | HEART RATE: 72 BPM | SYSTOLIC BLOOD PRESSURE: 118 MMHG

## 2022-12-07 DIAGNOSIS — O46.8X9 SUBCHORIONIC HEMATOMA, ANTEPARTUM, SINGLE OR UNSPECIFIED FETUS: Primary | ICD-10-CM

## 2022-12-07 DIAGNOSIS — Z86.718 HX OF DEEP VENOUS THROMBOSIS: Primary | ICD-10-CM

## 2022-12-07 DIAGNOSIS — Z3A.12 12 WEEKS GESTATION OF PREGNANCY: ICD-10-CM

## 2022-12-07 DIAGNOSIS — Z15.89 MTHFR MUTATION: ICD-10-CM

## 2022-12-07 DIAGNOSIS — O41.8X90 SUBCHORIONIC HEMATOMA, ANTEPARTUM, SINGLE OR UNSPECIFIED FETUS: Primary | ICD-10-CM

## 2022-12-07 PROCEDURE — 0500F INITIAL PRENATAL CARE VISIT: CPT | Performed by: NURSE PRACTITIONER

## 2022-12-07 RX ORDER — ONDANSETRON 4 MG/1
4 TABLET, FILM COATED ORAL EVERY 8 HOURS PRN
Qty: 30 TABLET | Refills: 1 | Status: SHIPPED | OUTPATIENT
Start: 2022-12-07

## 2022-12-07 NOTE — PROGRESS NOTES
3535 SArlette Pardo.  2022    YOB: 1995   Patient's last menstrual period was 2022 (exact date). 12w3d  [id number]      Primary Care Physician: Lawanda Jaquez DO        CC: Initial Prenatal Visit    Subjective:     3535 SArlette Pardo. is a 32 y.o. female     is being seen today for her first obstetrical visit. This is a planned pregnancy. She is at 12w3d  Her obstetrical history is significant for 2nd pregnancy, hx of DVT, subchorionic bleed. Patient does intend to breast feed. Pregnancy history fully reviewed. Mother's ethnicity:       Objective:   Blood pressure 118/78, height 5' 6\" (1.676 m), weight 146 lb (66.2 kg), last menstrual period 2022, not currently breastfeeding.     OB History    Para Term  AB Living   2 1 1 0 0 1   SAB IAB Ectopic Molar Multiple Live Births   0 0 0 0 0 1      # Outcome Date GA Lbr Tej/2nd Weight Sex Delivery Anes PTL Lv   2 Current            1 Term 10/27/12 40w0d  8 lb 4 oz (3.742 kg) M Vag-Spont   MARLENY       Past Medical History:   Diagnosis Date    History of DVT of lower extremity     MTHFR mutation     Ultrasound recheck of fetal pyelectasis, antepartum 2012    Varicose veins of both lower extremities, unspecified whether complicated      Past Surgical History:   Procedure Laterality Date    CARPAL TUNNEL RELEASE Right       Social History     Socioeconomic History    Marital status: Legally      Spouse name: Not on file    Number of children: Not on file    Years of education: Not on file    Highest education level: Not on file   Occupational History    Not on file   Tobacco Use    Smoking status: Never    Smokeless tobacco: Never   Vaping Use    Vaping Use: Every day    Substances: Nicotine   Substance and Sexual Activity    Alcohol use: No    Drug use: No    Sexual activity: Yes     Partners: Male   Other Topics Concern    Not on file   Social History Narrative    Not on file     Social Determinants of Health     Financial Resource Strain: Not on file   Food Insecurity: Not on file   Transportation Needs: Not on file   Physical Activity: Not on file   Stress: Not on file   Social Connections: Not on file   Intimate Partner Violence: Not on file   Housing Stability: Not on file     Family History   Problem Relation Age of Onset    Diabetes Maternal Grandmother     Hypertension Maternal Grandmother     Stroke Father     Deep Vein Thrombosis Father     Pulmonary Embolism Father     Asthma Sister     Bipolar Disorder Maternal Cousin     Congenital Heart Defect Maternal Cousin     Breast Cancer Neg Hx     Cancer Neg Hx     Colon Cancer Neg Hx     Eclampsia Neg Hx     Ovarian Cancer Neg Hx      Labor Neg Hx     Spont Abortions Neg Hx        MEDICATIONS:  Current Outpatient Medications   Medication Sig Dispense Refill    ondansetron (ZOFRAN) 4 MG tablet Take 1 tablet by mouth every 8 hours as needed for Nausea 30 tablet 1    Prenat w/o K-TF-Lyihtrk-FA-DHA (PNV-DHA) 27-0.6-0.4-300 MG CAPS Take 1 tablet by mouth      Pyridoxine HCl (B-6) 50 MG TABS Take 1 tablet by mouth daily 60 tablet 2    folic acid-pyridoxine-cyanocobalamine (FOLTX) 2.2-25-1 MG TABS tablet Take 1 tablet by mouth daily 30 tablet 8    FOLBIC 2.5-25-2 MG TABS TAKE 1 TABLET BY MOUTH ONCE DAILY (Patient not taking: No sig reported)       No current facility-administered medications for this visit. ALLERGIES:  Allergies as of 2022    (No Known Allergies)           Physical Exam Completed-See Epic Navigator               Assessment:      Pregnancy at 12 and 3/7 weeks    Diagnosis Orders   1.  Subchorionic hematoma, antepartum, single or unspecified fetus        2. 12 weeks gestation of pregnancy  PAP SMEAR    Culture, Genital    C.trachomatis N.gonorrhoeae DNA    ondansetron (ZOFRAN) 4 MG tablet              Patient Active Problem List    Diagnosis Date Noted    Subchorionic hematoma, antepartum 2022     Priority: High     Pelvic rest/ no heavy lifting       MTHFR mutation 10/18/2022     Priority: High    Hx of DVT of leg 2021 10/18/2022     Priority: High    Hypercoagulable state ([facility] [address] 2021    Carpal tunnel syndrome on right 2018                    Plan:      Initial labs drawn. Prenatal vitamins. RTO in 4 weeks. MFM 2022  Consult Hematology- MTHFR, hx of DVT. Vaginal cultures, pap collected. Problem list reviewed and updated. NT Screen/Quad Testing and MSAFP Single Marker: requested  Role of ultrasound in pregnancy discussed; fetal survey: requests  Amniocentesis discussed: Declined  NIPT Testing not indicated  Cultures & Wet Prep Collected  Follow-up in 4 weeks  Repeat Annual every 1 year  Cervical Cytology Evaluation begins at 24years old. If Negative Cytology, Follow-up screening per current guidelines. [facility] appointment scheduled      COUNSELING COMPLETED:  INITIAL OBSTETRICAL VISIT EVALUATION:  The patient was seen full history and physical was completed/reviewed. Cytology was collected for patients over 24years of age. Cultures were collected. The patient was counseled on office policies and she was counseled on termination of pregnancy in the state of [place]. The patient was counseled on Toxoplasmosis, HIV, Tobacco Abuse, Group Beta Strep Infections, Cystic Fibrosis,  Labor precautions and Sickle Cell disease. The patient was counseled on the risks of tobacco abuse. Both maternal and fetal. She was instructed to stop smoking if currently using tobacco. Morbidity, mortality, and cessation programs were reviewed. The risks include but are not limited to increased risks of  labor,  delivery, premature rupture of membranes, intrauterine growth restriction, intrauterine fetal demise and abruptio placenta. Secondary smoke risks were also reviewed.  Increases in cancer, respiratory problems, and sudden infant death syndrome were reviewed as well. The patient was informed of a 2-4% risk of congenital anomalies in the general population. She was also informed that karyotyping is the only way to evaluate the fetus for genetic problems and genetic lethal anomalies. Chorionic villous sampling, amniocentesis and NIPT testing were also discussed with morbidity rates in detail. She declined the procedure. Route of delivery and counseling on vaginal, operative vaginal, and  sections were completed with the risks of each to both the patient as well as her baby. The possibility of a blood transfusion was discussed as well. The patient was not opposed to receiving a transfusion if needed. Nuchal translucency/Quad Evaluation and MSAFP single marker testing was reviewed in detail with attention to timing of testing and their windows. For patients beyond the gestational age for Nuchal translucency evaluation Quad testing was recommended. Timing for the Quad test was reviewed. Benefits of the above testing was reviewed. A second trimester amniocentesis was also made available to the patient. Risks, Benefits and non-invasive alternative testing was reviewed. The literature regarding a questionable link to pitocin augmentation and induction of labor, the assistance of labor contractions and the initiation of contractions to help delivery, have been reviewed with the patient regarding the increased potential of having a  with Attention Deficit Hyperactivity Disorder and or Autism. These two disorders and the ramifications of their impact on a child and the family caring for that child has been reviewed with the patient in detail. She was given the risks, benefits and alternatives of the use of this medication. She has agreed to its use in the delivery of her unborn child if needed at the time of delivery, Yes.     The patient was questioned in detail regarding any genetic misnomer history, chromosomal abnormalities, or learning disabilities in  herself, the father of the baby or their families. SHE DENIED ANY HISTORY AS STATED ABOVE: Yes    Upon completion of the visit all questions were answered and the patients follow-up and testing schedule were reviewed. Prenatal vitamins were given. T-dap Vaccine recommendations reviewed with the patient. Patient notified of timing of vaccination 27-36 weeks gestation. Patient aware Vaccine is NOT Live. Yes. The patient, Sheyla Copeland is a 32 y.o. female, was seen with a total time spent of 30 minutes for the visit on this date of service by the E/M provider. The time component had both face to face and non face to face time spent in determining the total time component. Counseling and education regarding her diagnosis listed below and her options regarding those diagnoses were also included in determining her time component. Diagnosis Orders   1. Subchorionic hematoma, antepartum, single or unspecified fetus        2. 12 weeks gestation of pregnancy  PAP SMEAR    Culture, Genital    C.trachomatis N.gonorrhoeae DNA    ondansetron (ZOFRAN) 4 MG tablet           The patient had her preventative health maintenance recommendations and follow-up reviewed with her at the completion of her visit.

## 2022-12-07 NOTE — TELEPHONE ENCOUNTER
LVM for pt to call and schedule a consult with Dr Daly Sanford    Electronically signed by Lacie Gomez on 12/7/2022 at 11:24 AM

## 2022-12-08 ENCOUNTER — TELEPHONE (OUTPATIENT)
Dept: OBGYN CLINIC | Age: 27
End: 2022-12-08

## 2022-12-08 LAB
C TRACH DNA GENITAL QL NAA+PROBE: NEGATIVE
CANDIDA SPECIES, DNA PROBE: NEGATIVE
GARDNERELLA VAGINALIS, DNA PROBE: POSITIVE
N. GONORRHOEAE DNA: NEGATIVE
SOURCE: ABNORMAL
SPECIMEN DESCRIPTION: NORMAL
TRICHOMONAS VAGINALIS DNA: NEGATIVE

## 2022-12-08 NOTE — TELEPHONE ENCOUNTER
----- Message from JUSTIN Kim NP sent at 12/8/2022  9:01 AM EST -----  + BV  Flagyl 500mg PO BID x 7 days after 15 weeks

## 2022-12-09 ENCOUNTER — TELEPHONE (OUTPATIENT)
Dept: OBGYN CLINIC | Age: 27
End: 2022-12-09

## 2022-12-09 ENCOUNTER — ROUTINE PRENATAL (OUTPATIENT)
Dept: PERINATAL CARE | Age: 27
End: 2022-12-09
Payer: COMMERCIAL

## 2022-12-09 ENCOUNTER — HOSPITAL ENCOUNTER (OUTPATIENT)
Age: 27
Discharge: HOME | End: 2022-12-09
Payer: COMMERCIAL

## 2022-12-09 VITALS
HEART RATE: 100 BPM | WEIGHT: 146.16 LBS | HEIGHT: 66 IN | RESPIRATION RATE: 16 BRPM | DIASTOLIC BLOOD PRESSURE: 71 MMHG | TEMPERATURE: 98.2 F | SYSTOLIC BLOOD PRESSURE: 118 MMHG | BODY MASS INDEX: 23.49 KG/M2

## 2022-12-09 DIAGNOSIS — Z3A.12 12 WEEKS GESTATION OF PREGNANCY: ICD-10-CM

## 2022-12-09 DIAGNOSIS — Z36.9 FIRST TRIMESTER SCREENING: Primary | ICD-10-CM

## 2022-12-09 DIAGNOSIS — Z03.72 SUSPECTED PLACENTAL PROBLEM NOT FOUND: ICD-10-CM

## 2022-12-09 DIAGNOSIS — O41.8X10 SUBCHORIONIC HEMATOMA IN FIRST TRIMESTER, SINGLE OR UNSPECIFIED FETUS: ICD-10-CM

## 2022-12-09 DIAGNOSIS — O36.80X0 ENCOUNTER TO DETERMINE FETAL VIABILITY OF PREGNANCY, SINGLE OR UNSPECIFIED FETUS: ICD-10-CM

## 2022-12-09 DIAGNOSIS — O46.8X1 SUBCHORIONIC HEMATOMA IN FIRST TRIMESTER, SINGLE OR UNSPECIFIED FETUS: ICD-10-CM

## 2022-12-09 PROBLEM — Z82.49 FAMILY HISTORY OF DVT: Status: ACTIVE | Noted: 2022-12-09

## 2022-12-09 PROCEDURE — 36415 COLL VENOUS BLD VENIPUNCTURE: CPT

## 2022-12-09 PROCEDURE — 81508 FTL CGEN ABNOR TWO PROTEINS: CPT

## 2022-12-09 PROCEDURE — 76813 OB US NUCHAL MEAS 1 GEST: CPT | Performed by: OBSTETRICS & GYNECOLOGY

## 2022-12-09 NOTE — TELEPHONE ENCOUNTER
----- Message from JUSTIN Espinoza NP sent at 12/8/2022  9:01 AM EST -----  + BV  Flagyl 500mg PO BID x 7 days after 15 weeks

## 2022-12-10 LAB
CULTURE: NORMAL
SPECIMEN DESCRIPTION: NORMAL

## 2022-12-11 LAB
AFP INTERPRETATION: NORMAL
AFP SPECIMEN: NORMAL
CRL: 67.4 MM
CROWN RUMP LENGTH TWIN B: NORMAL MM
CROWN RUMP LENGTH: NORMAL
DATE OF BIRTH: NORMAL
DONOR EGG?: NO
GESTATIONAL AGE: NORMAL
HISTORY OF ANEUPLOIDY?: NO
IN VITRO FERTILIZATION: NO
MATERNAL AGE AT EDD: 29.7 YR
MATERNAL SCREEN, EER: NORMAL
MATERNAL WEIGHT: 146
MOM FOR NT, TWIN B: NORMAL
MOM FOR NT: 1.33
MOM FOR PAPP-A: 0.79
MONOCHORIONIC TWINS: NORMAL
MSHCG-MOM: 1.29
MSHCG: NORMAL IU/L
NUCHAL TRANSLUC (NT): 2.2 MM
NUCHAL TRANSLUC (NT): NORMAL
NUCHAL TRANSLUCENCY TWIN B: NORMAL MM
NUMBER OF FETUSES: 1
NUMBER OF FETUSES: NORMAL
PAPP-A MOM: 804.3 NG/ML
PATIENT WEIGHT UNITS: NORMAL
PATIENT WEIGHT: NORMAL
PREV TRISOMY PREG: NO
RACE (MATERNAL): NORMAL
RACE: NORMAL
READING MD CERT NUM: NORMAL
READING MD NAME: NORMAL
REPEAT SPECIMEN?: NO
SMOKING: YES
SMOKING: YES
SONOGRAPHER CERT NO: NORMAL
SONOGRAPHER CERT NO: NORMAL
SONOGRAPHER NAME: NORMAL
SONOGRAPHER NAME: NORMAL
ULTRASOUND DATE: NORMAL
ULTRASOUND DATE: NORMAL

## 2022-12-12 ENCOUNTER — TELEPHONE (OUTPATIENT)
Dept: OBGYN CLINIC | Age: 27
End: 2022-12-12

## 2022-12-12 NOTE — TELEPHONE ENCOUNTER
----- Message from JUSTIN Stuart CNP sent at 12/12/2022  7:55 AM EST -----  + candida on genital culture  OTC monistat if patient is symptomatic

## 2022-12-12 NOTE — TELEPHONE ENCOUNTER
Per Natalya Farrell pt notified of + candida on genital culture and pt instructed to use OTC monistat if patient is symptomatic

## 2022-12-14 ENCOUNTER — TELEPHONE (OUTPATIENT)
Dept: OBGYN CLINIC | Age: 27
End: 2022-12-14

## 2022-12-14 NOTE — TELEPHONE ENCOUNTER
----- Message from JUSTIN Rajan CNP sent at 12/14/2022 12:23 PM EST -----  Pap smear negative.   AGE 32  + candida on pap  Treat with OTC monistat if symptomatic  +BV- flagyl 500mg PO bID X7 days after 15 weeks gestation

## 2022-12-21 ENCOUNTER — TELEPHONE (OUTPATIENT)
Dept: OBGYN CLINIC | Age: 27
End: 2022-12-21

## 2022-12-21 NOTE — TELEPHONE ENCOUNTER
Per Mart Tillman NP, attempted to call pt re: results. VM full. Pt viewed results on [employer].

## 2022-12-21 NOTE — TELEPHONE ENCOUNTER
----- Message from JUSTIN Awad CNP sent at 12/14/2022 12:23 PM EST -----  Pap smear negative.   AGE 32  + candida on pap  Treat with OTC monistat if symptomatic  +BV- flagyl 500mg PO bID X7 days after 15 weeks gestation

## 2022-12-23 ENCOUNTER — TELEPHONE (OUTPATIENT)
Dept: ONCOLOGY | Age: 27
End: 2022-12-23

## 2023-01-04 ENCOUNTER — ROUTINE PRENATAL (OUTPATIENT)
Dept: OBGYN CLINIC | Age: 28
End: 2023-01-04

## 2023-01-04 VITALS
BODY MASS INDEX: 23.24 KG/M2 | SYSTOLIC BLOOD PRESSURE: 108 MMHG | HEART RATE: 79 BPM | WEIGHT: 144 LBS | DIASTOLIC BLOOD PRESSURE: 68 MMHG

## 2023-01-04 DIAGNOSIS — Z86.718 HX OF DEEP VENOUS THROMBOSIS: ICD-10-CM

## 2023-01-04 DIAGNOSIS — O41.8X90 SUBCHORIONIC HEMATOMA, ANTEPARTUM, SINGLE OR UNSPECIFIED FETUS: Primary | ICD-10-CM

## 2023-01-04 DIAGNOSIS — Z3A.16 16 WEEKS GESTATION OF PREGNANCY: ICD-10-CM

## 2023-01-04 DIAGNOSIS — Z15.89 MTHFR MUTATION: ICD-10-CM

## 2023-01-04 DIAGNOSIS — O46.8X9 SUBCHORIONIC HEMATOMA, ANTEPARTUM, SINGLE OR UNSPECIFIED FETUS: Primary | ICD-10-CM

## 2023-01-04 PROCEDURE — 0502F SUBSEQUENT PRENATAL CARE: CPT | Performed by: NURSE PRACTITIONER

## 2023-01-04 RX ORDER — METRONIDAZOLE 500 MG/1
500 TABLET ORAL 2 TIMES DAILY
Qty: 14 TABLET | Refills: 0 | Status: SHIPPED | OUTPATIENT
Start: 2023-01-04 | End: 2023-01-11

## 2023-01-04 NOTE — PROGRESS NOTES
Lorean Brittle is a 32 y.o. female 16w3d        OB History    Para Term  AB Living   2 1 1 0 0 1   SAB IAB Ectopic Molar Multiple Live Births   0 0 0   0 1      # Outcome Date GA Lbr Tej/2nd Weight Sex Delivery Anes PTL Lv   2 Current            1 Term 10/27/12 40w0d  8 lb 4 oz (3.742 kg) M Vag-Spont   MARLENY             Vitals  BP: 108/68  Weight: 144 lb (65.3 kg)  Heart Rate: 79  Patient Position: Sitting  Albumin: Negative  Glucose: Negative  Fundal Height (cm): 16 cm  Fetal HR: 144  Movement: Present      The patient was seen and evaluated. There was N/A fetal movements. No contractions or leakage of fluid. Signs and symptoms of  labor as well as labor were reviewed. The Nuchal Translucency testing was reviewed and found to be normal. A single marker MSAFP was ordered for a 15-20 week gestational age window. TOP ST OH Reviewed. Dates were reviewed with the patient. An 18-22 week anatomy ultrasound has been ordered. The patient will return to the office for her next visit in 4 weeks. See antepartum flow sheet. 22 Flagyl after 15 wk gest  TDAP @ 27 weeks      Assessment:  1. Lorean Brittle is a 32 y.o. female  2.   3. 16w3d    Patient Active Problem List    Diagnosis Date Noted    Subchorionic hematoma: RESOLVED 2022     Priority: High     Pelvic rest/ no heavy lifting       MTHFR mutation 10/18/2022     Priority: High    Hx of DVT of leg 2020 10/18/2022     Priority: High     Unprovoked left leg      FHx of DVT 2022     Patient's father hx DVT/PE/stroke       Hypercoagulable state ([facility] [address] 2021    Carpal tunnel syndrome on right 2018        Diagnosis Orders   1. Subchorionic hematoma, antepartum, single or unspecified fetus        2. MTHFR mutation        3.  Hx of deep venous thrombosis        4. 16 weeks gestation of pregnancy  Alpha Fetoprotein, Maternal            Plan:  Keep appt with hematology   Lab slip given         Patient was seen with total face to face time of 20 minutes. More than 50% of this visit was on counseling and education regarding her    Diagnosis Orders   1. Subchorionic hematoma, antepartum, single or unspecified fetus        2. MTHFR mutation        3. Hx of deep venous thrombosis        4. 16 weeks gestation of pregnancy  Alpha Fetoprotein, Maternal       and her options. She was also counseled on her preventative health maintenance recommendations and follow-up.

## 2023-02-01 ENCOUNTER — ROUTINE PRENATAL (OUTPATIENT)
Dept: OBGYN CLINIC | Age: 28
End: 2023-02-01

## 2023-02-01 VITALS
SYSTOLIC BLOOD PRESSURE: 122 MMHG | DIASTOLIC BLOOD PRESSURE: 70 MMHG | WEIGHT: 154 LBS | BODY MASS INDEX: 24.86 KG/M2 | HEART RATE: 97 BPM

## 2023-02-01 DIAGNOSIS — O09.92 HIGH-RISK PREGNANCY IN SECOND TRIMESTER: Primary | ICD-10-CM

## 2023-02-01 DIAGNOSIS — Z15.89 MTHFR MUTATION: ICD-10-CM

## 2023-02-01 DIAGNOSIS — Z3A.20 20 WEEKS GESTATION OF PREGNANCY: ICD-10-CM

## 2023-02-01 DIAGNOSIS — Z86.718 HX OF DEEP VENOUS THROMBOSIS: ICD-10-CM

## 2023-02-01 DIAGNOSIS — O46.8X9 SUBCHORIONIC HEMATOMA, ANTEPARTUM, SINGLE OR UNSPECIFIED FETUS: ICD-10-CM

## 2023-02-01 DIAGNOSIS — O41.8X90 SUBCHORIONIC HEMATOMA, ANTEPARTUM, SINGLE OR UNSPECIFIED FETUS: ICD-10-CM

## 2023-02-01 PROCEDURE — 0502F SUBSEQUENT PRENATAL CARE: CPT | Performed by: NURSE PRACTITIONER

## 2023-02-01 SDOH — ECONOMIC STABILITY: HOUSING INSECURITY
IN THE LAST 12 MONTHS, WAS THERE A TIME WHEN YOU DID NOT HAVE A STEADY PLACE TO SLEEP OR SLEPT IN A SHELTER (INCLUDING NOW)?: NO

## 2023-02-01 SDOH — ECONOMIC STABILITY: FOOD INSECURITY: WITHIN THE PAST 12 MONTHS, YOU WORRIED THAT YOUR FOOD WOULD RUN OUT BEFORE YOU GOT MONEY TO BUY MORE.: PATIENT DECLINED

## 2023-02-01 SDOH — ECONOMIC STABILITY: INCOME INSECURITY: HOW HARD IS IT FOR YOU TO PAY FOR THE VERY BASICS LIKE FOOD, HOUSING, MEDICAL CARE, AND HEATING?: SOMEWHAT HARD

## 2023-02-01 SDOH — ECONOMIC STABILITY: FOOD INSECURITY: WITHIN THE PAST 12 MONTHS, THE FOOD YOU BOUGHT JUST DIDN'T LAST AND YOU DIDN'T HAVE MONEY TO GET MORE.: NEVER TRUE

## 2023-02-01 SDOH — ECONOMIC STABILITY: TRANSPORTATION INSECURITY
IN THE PAST 12 MONTHS, HAS LACK OF TRANSPORTATION KEPT YOU FROM MEETINGS, WORK, OR FROM GETTING THINGS NEEDED FOR DAILY LIVING?: NO

## 2023-02-01 NOTE — PROGRESS NOTES
Linda MCCAIN is a 27 y.o. female 20w3d        OB History    Para Term  AB Living   2 1 1 0 0 1   SAB IAB Ectopic Molar Multiple Live Births   0 0 0   0 1      # Outcome Date GA Lbr Tej/2nd Weight Sex Delivery Anes PTL Lv   2 Current            1 Term 10/27/12 40w0d  8 lb 4 oz (3.742 kg) M Vag-Spont   MARLENY       Vitals  BP: 122/70  Weight: 154 lb (69.9 kg)  Heart Rate: 97  Patient Position: Sitting  Albumin: Negative  Glucose: Negative  Fundal Height (cm): 20 cm  Fetal HR: 156  Movement: Present    The patient was seen and evaluated. There was positive fetal movements. No contractions or leakage of fluid. Signs and symptoms of  labor as well as labor were reviewed.The Nuchal Translucency testing was reviewed and found to be normal A single marker MSAFP was reviewed and found to be ordered. The patients anatomy ultrasound has been completed and reviewed with patient. TOP ST OH Reviewed. A 28 week lab panel was ordered. This includes a (HH, 1 hr GTT, U/A C&S). The patient is to complete this in the next two to four weeks.    The S/S of Pre-Eclampsia were reviewed with the patient in detail. She is to report any of these if they occur. She currently denies any of these.    The patient is RH positive Rhogam Ordered no    The patient was instructed on fetal kick counts and was given a kick sheet to complete every 8 hours. This is to begin at 28 weeks gestation. She was instructed that the baby should move at a minimum of ten times within one hour after a meal. The patient was instructed to lay down on her left side twenty minutes after eating and count movements for up to one hour with a target value of ten movements.  She was instructed to notify the office if she did not make that target after two attempts or if after any attempt there was less than four movements.      22 Flagyl after 15 wk gest  TDAP @ 27 weeks    Assessment:  1. Linda MCCAIN is a 27 y.o. female  2.    3. 20w3d    Patient Active Problem List    Diagnosis Date Noted    Subchorionic hematoma: RESOLVED 2022     Priority: High     Pelvic rest/ no heavy lifting       MTHFR mutation 10/18/2022     Priority: High    Hx of DVT of leg 2020 10/18/2022     Priority: High     Unprovoked left leg      FHx of DVT 2022     Patient's father hx DVT/PE/stroke       Hypercoagulable state ([facility] [address] 2021    Carpal tunnel syndrome on right 2018        Diagnosis Orders   1. High-risk pregnancy in second trimester        2. Subchorionic hematoma, antepartum, single or unspecified fetus        3. MTHFR mutation        4. Hx of deep venous thrombosis        5. 20 weeks gestation of pregnancy              Plan:  The patient will return to the office for her next visit in 4 weeks. See antepartum flow sheet. Lab slip given  Keep appt with M        Patient was seen with total face to face time of 20 minutes. More than 50% of this visit was on counseling and education regarding her    Diagnosis Orders   1. High-risk pregnancy in second trimester        2. Subchorionic hematoma, antepartum, single or unspecified fetus        3. MTHFR mutation        4. Hx of deep venous thrombosis        5. 20 weeks gestation of pregnancy         and her options. She was also counseled on her preventative health maintenance recommendations and follow-up.

## 2023-02-03 ENCOUNTER — ROUTINE PRENATAL (OUTPATIENT)
Dept: PERINATAL CARE | Age: 28
End: 2023-02-03
Payer: COMMERCIAL

## 2023-02-03 VITALS
SYSTOLIC BLOOD PRESSURE: 134 MMHG | DIASTOLIC BLOOD PRESSURE: 83 MMHG | TEMPERATURE: 98.2 F | BODY MASS INDEX: 24.75 KG/M2 | HEART RATE: 88 BPM | WEIGHT: 154 LBS | RESPIRATION RATE: 16 BRPM | HEIGHT: 66 IN

## 2023-02-03 DIAGNOSIS — O99.112 PREGNANCY COMPLICATED BY COAGULATION DEFECT IN SECOND TRIMESTER (HCC): Primary | ICD-10-CM

## 2023-02-03 DIAGNOSIS — D68.9 PREGNANCY COMPLICATED BY COAGULATION DEFECT IN SECOND TRIMESTER (HCC): Primary | ICD-10-CM

## 2023-02-03 DIAGNOSIS — Z36.86 ENCOUNTER FOR SCREENING FOR RISK OF PRE-TERM LABOR: ICD-10-CM

## 2023-02-03 DIAGNOSIS — Z3A.20 20 WEEKS GESTATION OF PREGNANCY: ICD-10-CM

## 2023-02-03 DIAGNOSIS — Z82.49 FAMILY HISTORY OF THROMBOEMBOLIC DISEASE: ICD-10-CM

## 2023-02-03 DIAGNOSIS — Z86.718 HISTORY OF DEEP VENOUS THROMBOSIS: ICD-10-CM

## 2023-02-03 LAB
ABDOMINAL CIRCUMFERENCE: NORMAL
ABDOMINAL CIRCUMFERENCE: NORMAL
BIPARIETAL DIAMETER: NORMAL
BIPARIETAL DIAMETER: NORMAL
ESTIMATED FETAL WEIGHT: NORMAL
ESTIMATED FETAL WEIGHT: NORMAL
FEMORAL DIAMETER: NORMAL
FEMORAL DIAMETER: NORMAL
HC/AC: NORMAL
HC/AC: NORMAL
HEAD CIRCUMFERENCE: NORMAL
HEAD CIRCUMFERENCE: NORMAL

## 2023-02-03 PROCEDURE — 76811 OB US DETAILED SNGL FETUS: CPT | Performed by: OBSTETRICS & GYNECOLOGY

## 2023-02-03 PROCEDURE — 99999 PR OFFICE/OUTPT VISIT,PROCEDURE ONLY: CPT | Performed by: OBSTETRICS & GYNECOLOGY

## 2023-02-03 PROCEDURE — 76817 TRANSVAGINAL US OBSTETRIC: CPT | Performed by: OBSTETRICS & GYNECOLOGY

## 2023-03-02 ENCOUNTER — TELEPHONE (OUTPATIENT)
Dept: OBGYN CLINIC | Age: 28
End: 2023-03-02

## 2023-03-02 ENCOUNTER — TELEMEDICINE (OUTPATIENT)
Dept: OBGYN CLINIC | Age: 28
End: 2023-03-02

## 2023-03-02 DIAGNOSIS — Z15.89 MTHFR MUTATION: ICD-10-CM

## 2023-03-02 DIAGNOSIS — Z3A.24 24 WEEKS GESTATION OF PREGNANCY: Primary | ICD-10-CM

## 2023-03-02 DIAGNOSIS — Z86.718 HX OF DEEP VENOUS THROMBOSIS: ICD-10-CM

## 2023-03-02 NOTE — PROGRESS NOTES
Joaquina Trivedi is a 32 y.o. female 18w2d        OB History    Para Term  AB Living   2 1 1 0 0 1   SAB IAB Ectopic Molar Multiple Live Births   0 0 0   0 1      # Outcome Date GA Lbr Tej/2nd Weight Sex Delivery Anes PTL Lv   2 Current            1 Term 10/27/12 40w0d  8 lb 4 oz (3.742 kg) M Vag-Spont   MARLENY       Vitals       The patient was seen and evaluated. There was positive fetal movements. No contractions or leakage of fluid. Signs and symptoms of  labor as well as labor were reviewed. The Nuchal Translucency testing was reviewed and found to be normal A single marker MSAFP was reviewed and found to be not done. The patients anatomy ultrasound has been completed and reviewed with patient. TOP ST OH Reviewed. A 28 week lab panel was ordered. This includes a (HH, 1 hr GTT, U/A C&S). The patient is to complete this in the next two to four weeks. The S/S of Pre-Eclampsia were reviewed with the patient in detail. She is to report any of these if they occur. She currently denies any of these. The patient is RH positive Rhogam Ordered no    The patient was instructed on fetal kick counts and was given a kick sheet to complete every 8 hours. This is to begin at 28 weeks gestation. She was instructed that the baby should move at a minimum of ten times within one hour after a meal. The patient was instructed to lay down on her left side twenty minutes after eating and count movements for up to one hour with a target value of ten movements. She was instructed to notify the office if she did not make that target after two attempts or if after any attempt there was less than four movements. 22 Flagyl after 15 wk gest  TDAP @ 27 weeks      Assessment:  1. Joaquina Trivedi is a 32 y.o. female  2.    3. 24w4d    Patient Active Problem List    Diagnosis Date Noted    Subchorionic hematoma: RESOLVED 2022     Priority: High     Pelvic rest/ no heavy lifting  MTHFR mutation 10/18/2022     Priority: High    Hx of DVT of leg 2020 10/18/2022     Priority: High     Unprovoked left leg      FHx of DVT 12/09/2022     Patient's father hx DVT/PE/stroke       Hypercoagulable state ([facility] [address] 05/21/2021    Carpal tunnel syndrome on right 08/28/2018        Diagnosis Orders   1. MTHFR mutation        2. Hx of deep venous thrombosis              Plan:  The patient will return to the office for her next visit in 20 weeks. See antepartum flow sheet. Patient was seen with total face to face time of 20 minutes. More than 50% of this visit was on counseling and education regarding her    Diagnosis Orders   1. MTHFR mutation        2. Hx of deep venous thrombosis         and her options. She was also counseled on her preventative health maintenance recommendations and follow-up.

## 2023-03-27 RX ORDER — FOLIC ACID-PYRIDOXINE-CYANOCOBALAMIN TAB 2.5-25-2 MG 2.5-25-2 MG
1 TAB ORAL DAILY
COMMUNITY
Start: 2023-01-28

## 2023-03-29 ENCOUNTER — HOSPITAL ENCOUNTER (OUTPATIENT)
Age: 28
Discharge: HOME | End: 2023-03-29
Payer: COMMERCIAL

## 2023-03-29 ENCOUNTER — ROUTINE PRENATAL (OUTPATIENT)
Dept: OBGYN CLINIC | Age: 28
End: 2023-03-29

## 2023-03-29 VITALS
WEIGHT: 157 LBS | SYSTOLIC BLOOD PRESSURE: 122 MMHG | BODY MASS INDEX: 25.34 KG/M2 | DIASTOLIC BLOOD PRESSURE: 82 MMHG | HEART RATE: 88 BPM

## 2023-03-29 DIAGNOSIS — Z3A.28 28 WEEKS GESTATION OF PREGNANCY: ICD-10-CM

## 2023-03-29 DIAGNOSIS — O41.8X90 SUBCHORIONIC HEMATOMA, ANTEPARTUM, SINGLE OR UNSPECIFIED FETUS: ICD-10-CM

## 2023-03-29 DIAGNOSIS — Z3A.28 28 WEEKS GESTATION OF PREGNANCY: Primary | ICD-10-CM

## 2023-03-29 DIAGNOSIS — Z23 NEED FOR TDAP VACCINATION: ICD-10-CM

## 2023-03-29 DIAGNOSIS — Z15.89 MTHFR MUTATION: ICD-10-CM

## 2023-03-29 DIAGNOSIS — O46.8X9 SUBCHORIONIC HEMATOMA, ANTEPARTUM, SINGLE OR UNSPECIFIED FETUS: ICD-10-CM

## 2023-03-29 DIAGNOSIS — Z86.718 HX OF DEEP VENOUS THROMBOSIS: ICD-10-CM

## 2023-03-29 LAB
ABSOLUTE EOS #: 0.3 K/UL (ref 0–0.4)
ABSOLUTE LYMPH #: 1.3 K/UL (ref 1–4.8)
ABSOLUTE MONO #: 0.6 K/UL (ref 0.1–1.3)
BACTERIA: NORMAL
BASOPHILS # BLD: 0 % (ref 0–2)
BASOPHILS ABSOLUTE: 0 K/UL (ref 0–0.2)
BILIRUBIN URINE: NEGATIVE
CASTS UA: NORMAL /LPF
COLOR: YELLOW
EOSINOPHILS RELATIVE PERCENT: 3 % (ref 0–4)
EPITHELIAL CELLS UA: NORMAL /HPF
GLUCOSE ADMINISTRATION: NORMAL
GLUCOSE TOLERANCE SCREEN 50G: 128 MG/DL (ref 70–135)
GLUCOSE UR STRIP.AUTO-MCNC: NEGATIVE MG/DL
HCT VFR BLD AUTO: 35.6 % (ref 36–46)
HGB BLD-MCNC: 12.3 G/DL (ref 12–16)
KETONES UR STRIP.AUTO-MCNC: NEGATIVE MG/DL
LEUKOCYTE ESTERASE UR QL STRIP.AUTO: ABNORMAL
LYMPHOCYTES # BLD: 11 % (ref 24–44)
MCH RBC QN AUTO: 31.5 PG (ref 26–34)
MCHC RBC AUTO-ENTMCNC: 34.7 G/DL (ref 31–37)
MCV RBC AUTO: 90.8 FL (ref 80–100)
MONOCYTES # BLD: 5 % (ref 1–7)
NITRITE UR QL STRIP.AUTO: NEGATIVE
PDW BLD-RTO: 12.9 % (ref 11.5–14.9)
PLATELET # BLD AUTO: 193 K/UL (ref 150–450)
PMV BLD AUTO: 9 FL (ref 6–12)
PROT UR STRIP.AUTO-MCNC: 6.5 MG/DL (ref 5–8)
PROT UR STRIP.AUTO-MCNC: NEGATIVE MG/DL
RBC # BLD: 3.92 M/UL (ref 4–5.2)
RBC CLUMPS #/AREA URNS AUTO: NORMAL /HPF
SEG NEUTROPHILS: 81 % (ref 36–66)
SEGMENTED NEUTROPHILS ABSOLUTE COUNT: 9.1 K/UL (ref 1.3–9.1)
SPECIFIC GRAVITY UA: 1 (ref 1–1.03)
TURBIDITY: CLEAR
URINE HGB: NEGATIVE
UROBILINOGEN, URINE: NORMAL
WBC # BLD AUTO: 11.2 K/UL (ref 3.5–11)
WBC UA: NORMAL /HPF

## 2023-03-29 PROCEDURE — 82950 GLUCOSE TEST: CPT

## 2023-03-29 PROCEDURE — 85025 COMPLETE CBC W/AUTO DIFF WBC: CPT

## 2023-03-29 PROCEDURE — 36415 COLL VENOUS BLD VENIPUNCTURE: CPT

## 2023-03-29 PROCEDURE — 0502F SUBSEQUENT PRENATAL CARE: CPT | Performed by: NURSE PRACTITIONER

## 2023-03-29 PROCEDURE — 81001 URINALYSIS AUTO W/SCOPE: CPT

## 2023-03-29 NOTE — PROGRESS NOTES
mutation 10/18/2022     Priority: High    Hx of DVT of leg 2020 10/18/2022     Priority: High     Unprovoked left leg      FHx of DVT 2022     Patient's father hx DVT/PE/stroke       Hypercoagulable state ([facility] [address] 2021    Carpal tunnel syndrome on right 2018        Diagnosis Orders   1. 28 weeks gestation of pregnancy  Glucose tolerance, 1 hour    CBC with Auto Differential    Urinalysis with Reflex to Culture      2. Subchorionic hematoma, antepartum, single or unspecified fetus        3. MTHFR mutation        4. Hx of deep venous thrombosis        5. Need for Tdap vaccination  Tdap, ADACEL, (age 10y-63y), IM                Plan:  The patient will return to the office for her next visit in 2 weeks. See antepartum flow sheet. TDAP vaccine given. 28 week labs given. Fetal kick count sheet.  Testing Indicated: No  Scheduled with Nursing-Pt notified: No      Patient was seen with total face to face time of 20 minutes. More than 50% of this visit was on counseling and education regarding her    Diagnosis Orders   1. 28 weeks gestation of pregnancy  Glucose tolerance, 1 hour    CBC with Auto Differential    Urinalysis with Reflex to Culture      2. Subchorionic hematoma, antepartum, single or unspecified fetus        3. MTHFR mutation        4. Hx of deep venous thrombosis        5. Need for Tdap vaccination  Tdap, ADACEL, (age 10y-63y), IM       and her options. She was also counseled on her preventative health maintenance recommendations and follow-up.

## 2023-04-17 ENCOUNTER — OFFICE VISIT (OUTPATIENT)
Dept: ONCOLOGY | Age: 28
End: 2023-04-17
Payer: COMMERCIAL

## 2023-04-17 ENCOUNTER — TELEPHONE (OUTPATIENT)
Dept: ONCOLOGY | Age: 28
End: 2023-04-17

## 2023-04-17 VITALS
BODY MASS INDEX: 26.2 KG/M2 | DIASTOLIC BLOOD PRESSURE: 78 MMHG | HEART RATE: 73 BPM | TEMPERATURE: 97.5 F | WEIGHT: 162.3 LBS | SYSTOLIC BLOOD PRESSURE: 122 MMHG

## 2023-04-17 DIAGNOSIS — Z15.89 MTHFR MUTATION: Primary | ICD-10-CM

## 2023-04-17 DIAGNOSIS — D68.59 HYPERCOAGULABLE STATE (HCC): ICD-10-CM

## 2023-04-17 PROCEDURE — 99211 OFF/OP EST MAY X REQ PHY/QHP: CPT | Performed by: INTERNAL MEDICINE

## 2023-04-17 PROCEDURE — 99214 OFFICE O/P EST MOD 30 MIN: CPT | Performed by: INTERNAL MEDICINE

## 2023-05-01 ENCOUNTER — PROCEDURE VISIT (OUTPATIENT)
Dept: OBGYN CLINIC | Age: 28
End: 2023-05-01

## 2023-05-01 ENCOUNTER — ROUTINE PRENATAL (OUTPATIENT)
Dept: OBGYN CLINIC | Age: 28
End: 2023-05-01

## 2023-05-01 VITALS
HEART RATE: 80 BPM | SYSTOLIC BLOOD PRESSURE: 120 MMHG | WEIGHT: 166 LBS | DIASTOLIC BLOOD PRESSURE: 80 MMHG | BODY MASS INDEX: 26.79 KG/M2

## 2023-05-01 DIAGNOSIS — O09.93 HRP (HIGH RISK PREGNANCY), THIRD TRIMESTER: ICD-10-CM

## 2023-05-01 DIAGNOSIS — Z3A.33 33 WEEKS GESTATION OF PREGNANCY: ICD-10-CM

## 2023-05-01 DIAGNOSIS — Z3A.28 28 WEEKS GESTATION OF PREGNANCY: ICD-10-CM

## 2023-05-01 DIAGNOSIS — Z15.89 MTHFR MUTATION: ICD-10-CM

## 2023-05-01 DIAGNOSIS — Z86.718 HX OF DEEP VENOUS THROMBOSIS: ICD-10-CM

## 2023-05-01 DIAGNOSIS — O41.8X90 SUBCHORIONIC HEMATOMA, ANTEPARTUM, SINGLE OR UNSPECIFIED FETUS: ICD-10-CM

## 2023-05-01 DIAGNOSIS — O09.93 HIGH-RISK PREGNANCY IN THIRD TRIMESTER: Primary | ICD-10-CM

## 2023-05-01 DIAGNOSIS — D68.59 HYPERCOAGULABLE STATE (HCC): ICD-10-CM

## 2023-05-01 DIAGNOSIS — O46.8X9 SUBCHORIONIC HEMATOMA, ANTEPARTUM, SINGLE OR UNSPECIFIED FETUS: ICD-10-CM

## 2023-05-01 LAB
ABDOMINAL CIRCUMFERENCE: NORMAL
BIPARIETAL DIAMETER: NORMAL
ESTIMATED FETAL WEIGHT: NORMAL
FEMORAL DIAMETER: NORMAL
HC/AC: NORMAL
HEAD CIRCUMFERENCE: NORMAL

## 2023-05-01 PROCEDURE — 0502F SUBSEQUENT PRENATAL CARE: CPT | Performed by: NURSE PRACTITIONER

## 2023-05-01 NOTE — PROGRESS NOTES
2023 0.30  0.0 - 0.4 k/uL Final    Basophils Absolute 2023 0.00  0.0 - 0.2 k/uL Final    Color, UA 2023 Yellow  Yellow Final    Turbidity UA 2023 Clear  Clear Final    Glucose, Ur 2023 NEGATIVE  NEGATIVE Final    Bilirubin Urine 2023 NEGATIVE  NEGATIVE Final    Ketones, Urine 2023 NEGATIVE  NEGATIVE Final    Specific Shirley, UA 2023 1.005  1.000 - 1.030 Final    Urine Hgb 2023 NEGATIVE  NEGATIVE Final    pH, UA 2023 6.5  5.0 - 8.0 Final    Protein, UA 2023 NEGATIVE  NEGATIVE Final    Urobilinogen, Urine 2023 Normal  Normal Final    Nitrite, Urine 2023 NEGATIVE  NEGATIVE Final    Leukocyte Esterase, Urine 2023 TRACE (A)  NEGATIVE Final    WBC, UA 2023 0 TO 2  /HPF Final    RBC, UA 2023 0 TO 2  /HPF Final    Casts UA 2023 0 TO 2  /LPF Final    Epithelial Cells UA 2023 0 TO 2  /HPF Final    Bacteria, UA 2023 None  None Final   ]    3/29/2023 TDAP vaccine given in office. H/O DVT REFUSED ANTICOAGULATION      T-Dap Vaccine (27-36 weeks): completed    The patient was instructed on fetal kick counts and was given a kick sheet to complete every 8 hours. She was instructed that the baby should move at a minimum of ten times within one hour after a meal. The patient was instructed to lay down on her left side twenty minutes after eating and count movements for up to one hour with a target value of ten movements. She was instructed to notify the office if she did not make that target after two attempts or if after any attempt there was less than four movements. The patient reports that the targets have been made Yes.  Testing:  Not indicated    Assessment:  1Arlette Groves is a 32 y.o. female  2.    3. 33w1d    Patient Active Problem List    Diagnosis Date Noted    Subchorionic hematoma: RESOLVED 2022     Priority: High     Pelvic rest/ no heavy lifting

## 2023-05-17 ENCOUNTER — ROUTINE PRENATAL (OUTPATIENT)
Dept: OBGYN CLINIC | Age: 28
End: 2023-05-17

## 2023-05-17 VITALS
DIASTOLIC BLOOD PRESSURE: 68 MMHG | SYSTOLIC BLOOD PRESSURE: 112 MMHG | BODY MASS INDEX: 27.76 KG/M2 | WEIGHT: 172 LBS | HEART RATE: 82 BPM

## 2023-05-17 DIAGNOSIS — Z15.89 MTHFR MUTATION: ICD-10-CM

## 2023-05-17 DIAGNOSIS — Z86.718 HX OF DEEP VENOUS THROMBOSIS: ICD-10-CM

## 2023-05-17 DIAGNOSIS — Z3A.35 35 WEEKS GESTATION OF PREGNANCY: ICD-10-CM

## 2023-05-17 DIAGNOSIS — O41.8X90 SUBCHORIONIC HEMATOMA, ANTEPARTUM, SINGLE OR UNSPECIFIED FETUS: Primary | ICD-10-CM

## 2023-05-17 DIAGNOSIS — O46.8X9 SUBCHORIONIC HEMATOMA, ANTEPARTUM, SINGLE OR UNSPECIFIED FETUS: Primary | ICD-10-CM

## 2023-05-17 NOTE — PROGRESS NOTES
Bradley Tolliver is a 32 y.o. female 35w3d        OB History    Para Term  AB Living   2 1 1 0 0 1   SAB IAB Ectopic Molar Multiple Live Births   0 0 0   0 1      # Outcome Date GA Lbr Tej/2nd Weight Sex Delivery Anes PTL Lv   2 Current            1 Term 10/27/12 40w0d  8 lb 4 oz (3.742 kg) M Vag-Spont   MARLENY       Vitals  BP: 112/68  Weight - Scale: 172 lb (78 kg)  Pulse: 82  Patient Position: Sitting  Albumin: Negative  Glucose: Negative      The patient was seen and evaluated. There was positive fetal movements. No contractions or leakage of fluid. Signs and symptoms of  labor as well as labor were reviewed. The S/S of Pre-Eclampsia were reviewed with the patient in detail. She is to report any of these if they occur. She currently denies any of these. The patient had her 28 week labs completed. No visits with results within 5 Week(s) from this visit.    Latest known visit with results is:   Hospital Outpatient Visit on 2023   Component Date Value Ref Range Status    GLU ADMN 2023 Glucola   Final    Glucose tolerance screen 50g 2023 128  70 - 135 mg/dL Final    WBC 2023 11.2 (H)  3.5 - 11.0 k/uL Final    RBC 2023 3.92 (L)  4.0 - 5.2 m/uL Final    Hemoglobin 2023 12.3  12.0 - 16.0 g/dL Final    Hematocrit 2023 35.6 (L)  36 - 46 % Final    MCV 2023 90.8  80 - 100 fL Final    MCH 2023 31.5  26 - 34 pg Final    MCHC 2023 34.7  31 - 37 g/dL Final    RDW 2023 12.9  11.5 - 14.9 % Final    Platelets  193  150 - 450 k/uL Final    MPV 2023 9.0  6.0 - 12.0 fL Final    Seg Neutrophils 2023 81 (H)  36 - 66 % Final    Lymphocytes 2023 11 (L)  24 - 44 % Final    Monocytes 2023 5  1 - 7 % Final    Eosinophils % 2023 3  0 - 4 % Final    Basophils 2023 0  0 - 2 % Final    Segs Absolute 2023 9.10  1.3 - 9.1 k/uL Final    Absolute Lymph # 2023 1.30  1.0 -

## 2023-05-24 ENCOUNTER — ROUTINE PRENATAL (OUTPATIENT)
Dept: OBGYN CLINIC | Age: 28
End: 2023-05-24
Payer: COMMERCIAL

## 2023-05-24 ENCOUNTER — HOSPITAL ENCOUNTER (OUTPATIENT)
Age: 28
Setting detail: SPECIMEN
Discharge: HOME | End: 2023-05-24

## 2023-05-24 VITALS
BODY MASS INDEX: 27.76 KG/M2 | HEART RATE: 76 BPM | DIASTOLIC BLOOD PRESSURE: 72 MMHG | WEIGHT: 172 LBS | SYSTOLIC BLOOD PRESSURE: 112 MMHG

## 2023-05-24 DIAGNOSIS — O41.8X90 SUBCHORIONIC HEMATOMA, ANTEPARTUM, SINGLE OR UNSPECIFIED FETUS: ICD-10-CM

## 2023-05-24 DIAGNOSIS — O09.93 HIGH-RISK PREGNANCY IN THIRD TRIMESTER: ICD-10-CM

## 2023-05-24 DIAGNOSIS — Z86.718 HX OF DEEP VENOUS THROMBOSIS: ICD-10-CM

## 2023-05-24 DIAGNOSIS — Z15.89 MTHFR MUTATION: ICD-10-CM

## 2023-05-24 DIAGNOSIS — O09.93 HIGH-RISK PREGNANCY IN THIRD TRIMESTER: Primary | ICD-10-CM

## 2023-05-24 DIAGNOSIS — O46.8X9 SUBCHORIONIC HEMATOMA, ANTEPARTUM, SINGLE OR UNSPECIFIED FETUS: ICD-10-CM

## 2023-05-24 DIAGNOSIS — Z3A.36 36 WEEKS GESTATION OF PREGNANCY: ICD-10-CM

## 2023-05-24 PROCEDURE — 99213 OFFICE O/P EST LOW 20 MIN: CPT | Performed by: NURSE PRACTITIONER

## 2023-05-24 NOTE — PROGRESS NOTES
mg/dL Final    WBC 03/29/2023 11.2 (H)  3.5 - 11.0 k/uL Final    RBC 03/29/2023 3.92 (L)  4.0 - 5.2 m/uL Final    Hemoglobin 03/29/2023 12.3  12.0 - 16.0 g/dL Final    Hematocrit 03/29/2023 35.6 (L)  36 - 46 % Final    MCV 03/29/2023 90.8  80 - 100 fL Final    MCH 03/29/2023 31.5  26 - 34 pg Final    MCHC 03/29/2023 34.7  31 - 37 g/dL Final    RDW 03/29/2023 12.9  11.5 - 14.9 % Final    Platelets 25/63/7059 193  150 - 450 k/uL Final    MPV 03/29/2023 9.0  6.0 - 12.0 fL Final    Seg Neutrophils 03/29/2023 81 (H)  36 - 66 % Final    Lymphocytes 03/29/2023 11 (L)  24 - 44 % Final    Monocytes 03/29/2023 5  1 - 7 % Final    Eosinophils % 03/29/2023 3  0 - 4 % Final    Basophils 03/29/2023 0  0 - 2 % Final    Segs Absolute 03/29/2023 9.10  1.3 - 9.1 k/uL Final    Absolute Lymph # 03/29/2023 1.30  1.0 - 4.8 k/uL Final    Absolute Mono # 03/29/2023 0.60  0.1 - 1.3 k/uL Final    Absolute Eos # 03/29/2023 0.30  0.0 - 0.4 k/uL Final    Basophils Absolute 03/29/2023 0.00  0.0 - 0.2 k/uL Final    Color, UA 03/29/2023 Yellow  Yellow Final    Turbidity UA 03/29/2023 Clear  Clear Final    Glucose, Ur 03/29/2023 NEGATIVE  NEGATIVE Final    Bilirubin Urine 03/29/2023 NEGATIVE  NEGATIVE Final    Ketones, Urine 03/29/2023 NEGATIVE  NEGATIVE Final    Specific Gravity, UA 03/29/2023 1.005  1.000 - 1.030 Final    Urine Hgb 03/29/2023 NEGATIVE  NEGATIVE Final    pH, UA 03/29/2023 6.5  5.0 - 8.0 Final    Protein, UA 03/29/2023 NEGATIVE  NEGATIVE Final    Urobilinogen, Urine 03/29/2023 Normal  Normal Final    Nitrite, Urine 03/29/2023 NEGATIVE  NEGATIVE Final    Leukocyte Esterase, Urine 03/29/2023 TRACE (A)  NEGATIVE Final    WBC, UA 03/29/2023 0 TO 2  /HPF Final    RBC, UA 03/29/2023 0 TO 2  /HPF Final    Casts UA 03/29/2023 0 TO 2  /LPF Final    Epithelial Cells UA 03/29/2023 0 TO 2  /HPF Final    Bacteria, UA 03/29/2023 None  None Final   ]  Sensitivities for clindamycin and erythromycin were ordered.  No      The patient was counseled

## 2023-05-27 LAB
MICROORGANISM SPEC CULT: ABNORMAL
SPECIMEN DESCRIPTION: ABNORMAL

## 2023-05-29 ENCOUNTER — HOSPITAL ENCOUNTER (INPATIENT)
Age: 28
LOS: 2 days | Discharge: HOME | End: 2023-05-31
Attending: OBSTETRICS & GYNECOLOGY | Admitting: OBSTETRICS & GYNECOLOGY
Payer: COMMERCIAL

## 2023-05-29 PROBLEM — Z15.89 MTHFR MUTATION: Chronic | Status: ACTIVE | Noted: 2022-10-18

## 2023-05-29 PROBLEM — Z3A.37 37 WEEKS GESTATION OF PREGNANCY: Status: ACTIVE | Noted: 2023-05-29

## 2023-05-29 LAB
ABO + RH BLD: NORMAL
AMPHET UR QL SCN: NEGATIVE
ARM BAND NUMBER: NORMAL
BARBITURATES UR QL SCN: NEGATIVE
BASOPHILS # BLD: 0.06 K/UL (ref 0–0.2)
BASOPHILS NFR BLD: 1 % (ref 0–2)
BENZODIAZ UR QL: NEGATIVE
BLOOD GROUP ANTIBODIES SERPL: NEGATIVE
CANNABINOID SCREEN URINE: NEGATIVE
COCAINE UR QL SCN: NEGATIVE
EOSINOPHIL # BLD: 0.45 K/UL (ref 0–0.44)
EOSINOPHILS RELATIVE PERCENT: 5 % (ref 1–4)
ERYTHROCYTE [DISTWIDTH] IN BLOOD BY AUTOMATED COUNT: 12.4 % (ref 11.8–14.4)
EXPIRATION DATE: NORMAL
FENTANYL URINE: NEGATIVE
HCT VFR BLD AUTO: 34.2 % (ref 36.3–47.1)
HGB BLD-MCNC: 11.9 G/DL (ref 11.9–15.1)
IMM GRANULOCYTES # BLD AUTO: 0.03 K/UL (ref 0–0.3)
IMM GRANULOCYTES NFR BLD: 0 %
LYMPHOCYTES # BLD: 14 % (ref 24–43)
LYMPHOCYTES NFR BLD: 1.22 K/UL (ref 1.1–3.7)
MCH RBC QN AUTO: 32.7 PG (ref 25.2–33.5)
MCHC RBC AUTO-ENTMCNC: 34.8 G/DL (ref 28.4–34.8)
MCV RBC AUTO: 94 FL (ref 82.6–102.9)
METHADONE SCREEN, URINE: NEGATIVE
MONOCYTES NFR BLD: 0.58 K/UL (ref 0.1–1.2)
MONOCYTES NFR BLD: 7 % (ref 3–12)
NEUTROPHILS NFR BLD: 73 % (ref 36–65)
NEUTS SEG NFR BLD: 6.5 K/UL (ref 1.5–8.1)
NRBC AUTOMATED: 0 PER 100 WBC
OPIATES UR QL SCN: NEGATIVE
OXYCODONE SCREEN URINE: NEGATIVE
PCP UR QL SCN: NEGATIVE
PLATELET # BLD AUTO: 160 K/UL (ref 138–453)
PMV BLD AUTO: 11.9 FL (ref 8.1–13.5)
RBC # BLD AUTO: 3.64 M/UL (ref 3.95–5.11)
T PALLIDUM AB SER QL IA: NONREACTIVE
TEST INFORMATION: NORMAL
WBC OTHER # BLD: 8.8 K/UL (ref 3.5–11.3)

## 2023-05-29 PROCEDURE — 2580000003 HC RX 258

## 2023-05-29 PROCEDURE — 80307 DRUG TEST PRSMV CHEM ANLYZR: CPT

## 2023-05-29 PROCEDURE — 6360000002 HC RX W HCPCS

## 2023-05-29 PROCEDURE — 2500000003 HC RX 250 WO HCPCS

## 2023-05-29 PROCEDURE — 7200000001 HC VAGINAL DELIVERY

## 2023-05-29 PROCEDURE — 1220000000 HC SEMI PRIVATE OB R&B

## 2023-05-29 PROCEDURE — 85025 COMPLETE CBC W/AUTO DIFF WBC: CPT

## 2023-05-29 PROCEDURE — 86901 BLOOD TYPING SEROLOGIC RH(D): CPT

## 2023-05-29 PROCEDURE — 0KQM0ZZ REPAIR PERINEUM MUSCLE, OPEN APPROACH: ICD-10-PCS | Performed by: OBSTETRICS & GYNECOLOGY

## 2023-05-29 PROCEDURE — 86850 RBC ANTIBODY SCREEN: CPT

## 2023-05-29 PROCEDURE — 86900 BLOOD TYPING SEROLOGIC ABO: CPT

## 2023-05-29 PROCEDURE — 6370000000 HC RX 637 (ALT 250 FOR IP)

## 2023-05-29 PROCEDURE — 86780 TREPONEMA PALLIDUM: CPT

## 2023-05-29 RX ORDER — SODIUM CHLORIDE, SODIUM LACTATE, POTASSIUM CHLORIDE, AND CALCIUM CHLORIDE .6; .31; .03; .02 G/100ML; G/100ML; G/100ML; G/100ML
500 INJECTION, SOLUTION INTRAVENOUS PRN
Status: DISCONTINUED | OUTPATIENT
Start: 2023-05-29 | End: 2023-05-29

## 2023-05-29 RX ORDER — DIPHENHYDRAMINE HCL 25 MG
25 TABLET ORAL EVERY 4 HOURS PRN
Status: DISCONTINUED | OUTPATIENT
Start: 2023-05-29 | End: 2023-05-29

## 2023-05-29 RX ORDER — SODIUM CHLORIDE 0.9 % (FLUSH) 0.9 %
5-40 SYRINGE (ML) INJECTION PRN
Status: DISCONTINUED | OUTPATIENT
Start: 2023-05-29 | End: 2023-05-31 | Stop reason: HOSPADM

## 2023-05-29 RX ORDER — MORPHINE SULFATE 10 MG/ML
10 INJECTION, SOLUTION INTRAMUSCULAR; INTRAVENOUS ONCE
Status: COMPLETED | OUTPATIENT
Start: 2023-05-29 | End: 2023-05-29

## 2023-05-29 RX ORDER — LIDOCAINE HYDROCHLORIDE 10 MG/ML
INJECTION, SOLUTION INFILTRATION; PERINEURAL
Status: DISCONTINUED
Start: 2023-05-29 | End: 2023-05-29

## 2023-05-29 RX ORDER — HYDROCORTISONE 25 MG/G
CREAM TOPICAL
Status: DISCONTINUED | OUTPATIENT
Start: 2023-05-29 | End: 2023-05-31 | Stop reason: HOSPADM

## 2023-05-29 RX ORDER — LANOLIN 72 %
OINTMENT (GRAM) TOPICAL PRN
Status: DISCONTINUED | OUTPATIENT
Start: 2023-05-29 | End: 2023-05-31 | Stop reason: HOSPADM

## 2023-05-29 RX ORDER — ACETAMINOPHEN 500 MG
1000 TABLET ORAL EVERY 6 HOURS PRN
Status: DISCONTINUED | OUTPATIENT
Start: 2023-05-29 | End: 2023-05-29

## 2023-05-29 RX ORDER — SODIUM CHLORIDE 0.9 % (FLUSH) 0.9 %
5-40 SYRINGE (ML) INJECTION EVERY 12 HOURS SCHEDULED
Status: DISCONTINUED | OUTPATIENT
Start: 2023-05-29 | End: 2023-05-31 | Stop reason: HOSPADM

## 2023-05-29 RX ORDER — KETOROLAC TROMETHAMINE 30 MG/ML
INJECTION, SOLUTION INTRAMUSCULAR; INTRAVENOUS
Status: COMPLETED
Start: 2023-05-29 | End: 2023-05-29

## 2023-05-29 RX ORDER — ACETAMINOPHEN 500 MG
1000 TABLET ORAL EVERY 6 HOURS PRN
Qty: 30 TABLET | Refills: 1 | Status: SHIPPED | OUTPATIENT
Start: 2023-05-29

## 2023-05-29 RX ORDER — SODIUM CHLORIDE, SODIUM LACTATE, POTASSIUM CHLORIDE, AND CALCIUM CHLORIDE .6; .31; .03; .02 G/100ML; G/100ML; G/100ML; G/100ML
1000 INJECTION, SOLUTION INTRAVENOUS PRN
Status: DISCONTINUED | OUTPATIENT
Start: 2023-05-29 | End: 2023-05-29

## 2023-05-29 RX ORDER — KETOROLAC TROMETHAMINE 30 MG/ML
30 INJECTION, SOLUTION INTRAMUSCULAR; INTRAVENOUS ONCE
Status: COMPLETED | OUTPATIENT
Start: 2023-05-29 | End: 2023-05-29

## 2023-05-29 RX ORDER — SODIUM CHLORIDE, SODIUM LACTATE, POTASSIUM CHLORIDE, CALCIUM CHLORIDE 600; 310; 30; 20 MG/100ML; MG/100ML; MG/100ML; MG/100ML
INJECTION, SOLUTION INTRAVENOUS CONTINUOUS
Status: DISCONTINUED | OUTPATIENT
Start: 2023-05-29 | End: 2023-05-29

## 2023-05-29 RX ORDER — ONDANSETRON 2 MG/ML
4 INJECTION INTRAMUSCULAR; INTRAVENOUS EVERY 6 HOURS PRN
Status: DISCONTINUED | OUTPATIENT
Start: 2023-05-29 | End: 2023-05-31 | Stop reason: HOSPADM

## 2023-05-29 RX ORDER — ONDANSETRON 2 MG/ML
4 INJECTION INTRAMUSCULAR; INTRAVENOUS EVERY 6 HOURS PRN
Status: DISCONTINUED | OUTPATIENT
Start: 2023-05-29 | End: 2023-05-29

## 2023-05-29 RX ORDER — ACETAMINOPHEN 500 MG
1000 TABLET ORAL EVERY 6 HOURS PRN
Status: DISCONTINUED | OUTPATIENT
Start: 2023-05-29 | End: 2023-05-31 | Stop reason: HOSPADM

## 2023-05-29 RX ORDER — SODIUM CHLORIDE 0.9 % (FLUSH) 0.9 %
5-40 SYRINGE (ML) INJECTION EVERY 12 HOURS SCHEDULED
Status: DISCONTINUED | OUTPATIENT
Start: 2023-05-29 | End: 2023-05-29

## 2023-05-29 RX ORDER — ENOXAPARIN SODIUM 100 MG/ML
40 INJECTION SUBCUTANEOUS DAILY
Status: DISCONTINUED | OUTPATIENT
Start: 2023-05-30 | End: 2023-05-31 | Stop reason: HOSPADM

## 2023-05-29 RX ORDER — MORPHINE SULFATE 2 MG/ML
INJECTION, SOLUTION INTRAMUSCULAR; INTRAVENOUS
Status: DISCONTINUED
Start: 2023-05-29 | End: 2023-05-29

## 2023-05-29 RX ORDER — IBUPROFEN 600 MG/1
600 TABLET ORAL EVERY 6 HOURS PRN
Qty: 40 TABLET | Refills: 1 | Status: SHIPPED | OUTPATIENT
Start: 2023-05-29

## 2023-05-29 RX ORDER — PROCHLORPERAZINE EDISYLATE 5 MG/ML
10 INJECTION INTRAMUSCULAR; INTRAVENOUS ONCE
Status: COMPLETED | OUTPATIENT
Start: 2023-05-29 | End: 2023-05-29

## 2023-05-29 RX ORDER — SODIUM CHLORIDE 0.9 % (FLUSH) 0.9 %
5-40 SYRINGE (ML) INJECTION PRN
Status: DISCONTINUED | OUTPATIENT
Start: 2023-05-29 | End: 2023-05-29

## 2023-05-29 RX ORDER — MORPHINE SULFATE 10 MG/ML
INJECTION, SOLUTION INTRAMUSCULAR; INTRAVENOUS
Status: DISCONTINUED
Start: 2023-05-29 | End: 2023-05-29 | Stop reason: WASHOUT

## 2023-05-29 RX ORDER — SENNA AND DOCUSATE SODIUM 50; 8.6 MG/1; MG/1
2 TABLET, FILM COATED ORAL 2 TIMES DAILY
Status: DISCONTINUED | OUTPATIENT
Start: 2023-05-29 | End: 2023-05-31 | Stop reason: HOSPADM

## 2023-05-29 RX ORDER — SODIUM CHLORIDE 9 MG/ML
INJECTION, SOLUTION INTRAVENOUS PRN
Status: DISCONTINUED | OUTPATIENT
Start: 2023-05-29 | End: 2023-05-31 | Stop reason: HOSPADM

## 2023-05-29 RX ORDER — SENNA AND DOCUSATE SODIUM 50; 8.6 MG/1; MG/1
1 TABLET, FILM COATED ORAL 2 TIMES DAILY
Qty: 60 TABLET | Refills: 0 | Status: SHIPPED | OUTPATIENT
Start: 2023-05-29

## 2023-05-29 RX ORDER — IBUPROFEN 600 MG/1
600 TABLET ORAL EVERY 6 HOURS
Status: DISCONTINUED | OUTPATIENT
Start: 2023-05-30 | End: 2023-05-31 | Stop reason: HOSPADM

## 2023-05-29 RX ORDER — SODIUM CHLORIDE 9 MG/ML
25 INJECTION, SOLUTION INTRAVENOUS PRN
Status: DISCONTINUED | OUTPATIENT
Start: 2023-05-29 | End: 2023-05-29

## 2023-05-29 RX ORDER — SIMETHICONE 80 MG
80 TABLET,CHEWABLE ORAL EVERY 6 HOURS PRN
Status: DISCONTINUED | OUTPATIENT
Start: 2023-05-29 | End: 2023-05-31 | Stop reason: HOSPADM

## 2023-05-29 RX ADMIN — ACETAMINOPHEN 1000 MG: 500 TABLET ORAL at 22:53

## 2023-05-29 RX ADMIN — PROCHLORPERAZINE EDISYLATE 10 MG: 5 INJECTION INTRAMUSCULAR; INTRAVENOUS at 16:59

## 2023-05-29 RX ADMIN — SODIUM CHLORIDE, POTASSIUM CHLORIDE, SODIUM LACTATE AND CALCIUM CHLORIDE: 600; 310; 30; 20 INJECTION, SOLUTION INTRAVENOUS at 16:28

## 2023-05-29 RX ADMIN — KETOROLAC TROMETHAMINE: 30 INJECTION, SOLUTION INTRAMUSCULAR; INTRAVENOUS at 20:05

## 2023-05-29 RX ADMIN — Medication 1 MILLI-UNITS/MIN: at 14:33

## 2023-05-29 RX ADMIN — Medication 2.5 MILLION UNITS: at 16:38

## 2023-05-29 RX ADMIN — MORPHINE SULFATE 10 MG: 10 INJECTION INTRAVENOUS at 16:59

## 2023-05-29 RX ADMIN — LIDOCAINE HYDROCHLORIDE: 10 INJECTION, SOLUTION INFILTRATION; PERINEURAL at 20:06

## 2023-05-29 RX ADMIN — SODIUM CHLORIDE 5 MILLION UNITS: 900 INJECTION INTRAVENOUS at 08:37

## 2023-05-29 RX ADMIN — Medication 2.5 MILLION UNITS: at 12:36

## 2023-05-29 ASSESSMENT — PAIN DESCRIPTION - LOCATION
LOCATION: ABDOMEN

## 2023-05-29 ASSESSMENT — PAIN SCALES - GENERAL
PAINLEVEL_OUTOF10: 10
PAINLEVEL_OUTOF10: 2
PAINLEVEL_OUTOF10: 10

## 2023-05-29 ASSESSMENT — PAIN DESCRIPTION - DESCRIPTORS: DESCRIPTORS: CRAMPING

## 2023-05-29 ASSESSMENT — PAIN DESCRIPTION - PAIN TYPE: TYPE: ACUTE PAIN

## 2023-05-29 ASSESSMENT — PAIN DESCRIPTION - FREQUENCY: FREQUENCY: INTERMITTENT

## 2023-05-29 ASSESSMENT — PAIN - FUNCTIONAL ASSESSMENT: PAIN_FUNCTIONAL_ASSESSMENT: PREVENTS OR INTERFERES SOME ACTIVE ACTIVITIES AND ADLS

## 2023-05-29 ASSESSMENT — PAIN DESCRIPTION - ONSET: ONSET: ON-GOING

## 2023-05-29 ASSESSMENT — PAIN DESCRIPTION - ORIENTATION
ORIENTATION: LOWER;MID
ORIENTATION: MID

## 2023-05-29 NOTE — CARE COORDINATION
ANTEPARTUM NOTE    37 weeks gestation of pregnancy [Z3A.37]    Saman Lara was admitted to L&D on 5/29/2023 for Labor w/ SROM @ 37w1d. OB GYN Provider: Dr. Nelida Verma     Will meet with patient after delivery to verify name/address/phone/insurance and discuss discharge planning. Anticipate DC home 2 nights after vaginal delivery or 4 nights after C/S delivery as long as hemodynamically stable.

## 2023-05-29 NOTE — FLOWSHEET NOTE
Pt arrived to l&d with c/o leakage of fluid pt denies problems with pregnancy, pt appears to be comfortable / uncomfortable, no guarding or grimacing noted, pt  Denies bleeding , pt states she feels baby moving. external fetal monitor applied and explained. Dr Ramon Kohli notified of patient.

## 2023-05-29 NOTE — H&P
[address] [facility] [place]    Date: 2023       Time: 7:10 AM   Patient Name: Ulysses Casillas     Patient : 1995  Room/Bed: [facility]/[place] [address]    Admission Date/Time: 2023  6:35 AM      CC: Spontaneous Labor with Spontaneous Rupture of Membranes      HPI: Ulysses Casillas is a 32 y.o.  at 42w4d who presents Spontaneous Labor with Spontaneous Rupture of Membranes. Patient noticed a gush of fluid this morning at approximately 5:30 AM.  She is experienced continuous trickling of clear vaginal discharge since. The patient reports she began feeling contractions approximately 2 hours ago, describes them being in a consistent pattern. The patient reports fetal movement is present, complains of contractions, complains of loss of fluid, denies vaginal bleeding. Patient denies any headache, visual changes, difficulty breathing, RUQ pain, N/V, F/C, and pain/swelling in lower extremities. Denies any dysuria or vaginal discharge. DATING:  LMP: Patient's last menstrual period was 2022 (exact date).   Estimated Date of Delivery: 23   Based on: early ultrasound, at 5 1/7 weeks GA    PREGNANCY RISK FACTORS:  Patient Active Problem List   Diagnosis    Carpal tunnel syndrome on right    Hypercoagulable state ([facility] [address]    MTHFR mutation    Hx of DVT of leg     Subchorionic hematoma: RESOLVED 2022    FHx of DVT       REVIEW OF SYSTEMS:   Constitutional: negative fever, negative chills  HEENT: negative visual disturbances, negative headaches, negative dizziness  Breast: negative breast abnormalities, negative breast lumps, negative nipple discharge  Respiratory: negative dyspnea, negative cough, negative SOB  Cardiovascular: negative chest pain,  negative palpitations, negative arrhythmia, negative syncope   Gastrointestinal: negative abdominal pain, negative RUQ pain, negative N/V/D, negative constipation, negative bowel changes, negative heartburn

## 2023-05-29 NOTE — DISCHARGE SUMMARY
for Pain     enoxaparin 40 MG/0.4ML  Commonly known as: Lovenox  Inject 0.4 mLs into the skin daily     ibuprofen 600 MG tablet  Commonly known as: ADVIL;MOTRIN  Take 1 tablet by mouth every 6 hours as needed for Pain     sennosides-docusate sodium 8.6-50 MG tablet  Commonly known as: SENOKOT-S  Take 1 tablet by mouth in the morning and at bedtime            CONTINUE taking these medications      folic acid-pyridoxine-cyanocobalamine 2.2-25-1 MG Tabs tablet  Commonly known as: FOLTX  Take 1 tablet by mouth daily     PNV-DHA 27-0.6-0.4-300 MG Caps               Where to Get Your Medications        These medications were sent to [facility] [address] [facility], [address], [address]  [address]      Phone: [phone]   acetaminophen 500 MG tablet  enoxaparin 40 MG/0.4ML  ibuprofen 600 MG tablet  sennosides-docusate sodium 8.6-50 MG tablet           Activity: pelvic rest x 6 weeks, no lifting greater than 15 lbs  Diet: regular diet  Follow up: 2 weeks for  Postpartum visit    Condition on discharge: stable    Discharge date: 5/31/23    Marian Berry DO  Ob/Gyn Resident    Comments:  Home care and follow-up care were reviewed. Pelvic rest, and birth control were reviewed. Signs and symptoms of mastitis and post partum depression were reviewed. The patient is to notify her physician if any of these occur. The patient was counseled on secondary smoke risks and the increased risk of sudden infant death syndrome and respiratory problems to her baby with exposure. She was counseled on various alternate recommendations to decrease the exposure to secondary smoke to her children.

## 2023-05-30 ENCOUNTER — TELEPHONE (OUTPATIENT)
Dept: OBGYN CLINIC | Age: 28
End: 2023-05-30

## 2023-05-30 PROBLEM — O99.820 GBS (GROUP B STREPTOCOCCUS CARRIER), +RV CULTURE, CURRENTLY PREGNANT: Status: ACTIVE | Noted: 2023-05-30

## 2023-05-30 PROBLEM — O14.93 PRE-ECLAMPSIA IN THIRD TRIMESTER: Status: ACTIVE | Noted: 2023-05-30

## 2023-05-30 PROBLEM — O13.9 GESTATIONAL HYPERTENSION: Status: ACTIVE | Noted: 2023-05-30

## 2023-05-30 LAB
ALBUMIN SERPL-MCNC: 2.7 G/DL (ref 3.5–5.2)
ALBUMIN/GLOB SERPL: 1 {RATIO} (ref 1–2.5)
ALP SERPL-CCNC: 261 U/L (ref 35–104)
ALT SERPL-CCNC: 10 U/L (ref 5–33)
ANION GAP SERPL CALCULATED.3IONS-SCNC: 12 MMOL/L (ref 9–17)
AST SERPL-CCNC: 25 U/L
BASOPHILS # BLD: 0.03 K/UL (ref 0–0.2)
BASOPHILS NFR BLD: 0 % (ref 0–2)
BILIRUB SERPL-MCNC: 0.3 MG/DL (ref 0.3–1.2)
BUN SERPL-MCNC: 10 MG/DL (ref 6–20)
CALCIUM SERPL-MCNC: 8.7 MG/DL (ref 8.6–10.4)
CHLORIDE SERPL-SCNC: 107 MMOL/L (ref 98–107)
CO2 SERPL-SCNC: 17 MMOL/L (ref 20–31)
CREAT SERPL-MCNC: 0.6 MG/DL (ref 0.5–0.9)
CREAT UR-MCNC: 38.3 MG/DL (ref 28–217)
EOSINOPHIL # BLD: <0.03 K/UL (ref 0–0.44)
EOSINOPHILS RELATIVE PERCENT: 0 % (ref 1–4)
ERYTHROCYTE [DISTWIDTH] IN BLOOD BY AUTOMATED COUNT: 12.3 % (ref 11.8–14.4)
GFR SERPL CREATININE-BSD FRML MDRD: >60 ML/MIN/1.73M2
GLUCOSE SERPL-MCNC: 99 MG/DL (ref 70–99)
HCT VFR BLD AUTO: 36.9 % (ref 36.3–47.1)
HGB BLD-MCNC: 12.3 G/DL (ref 11.9–15.1)
IMM GRANULOCYTES # BLD AUTO: 0.13 K/UL (ref 0–0.3)
IMM GRANULOCYTES NFR BLD: 1 %
LYMPHOCYTES # BLD: 7 % (ref 24–43)
LYMPHOCYTES NFR BLD: 1.35 K/UL (ref 1.1–3.7)
MCH RBC QN AUTO: 32.1 PG (ref 25.2–33.5)
MCHC RBC AUTO-ENTMCNC: 33.3 G/DL (ref 28.4–34.8)
MCV RBC AUTO: 96.3 FL (ref 82.6–102.9)
MONOCYTES NFR BLD: 1.07 K/UL (ref 0.1–1.2)
MONOCYTES NFR BLD: 5 % (ref 3–12)
NEUTROPHILS NFR BLD: 88 % (ref 36–65)
NEUTS SEG NFR BLD: 18.17 K/UL (ref 1.5–8.1)
NRBC AUTOMATED: 0 PER 100 WBC
PLATELET # BLD AUTO: 156 K/UL (ref 138–453)
PMV BLD AUTO: 12.3 FL (ref 8.1–13.5)
POTASSIUM SERPL-SCNC: 4.5 MMOL/L (ref 3.7–5.3)
PROT SERPL-MCNC: 5.4 G/DL (ref 6.4–8.3)
RBC # BLD AUTO: 3.83 M/UL (ref 3.95–5.11)
SODIUM SERPL-SCNC: 136 MMOL/L (ref 135–144)
TOTAL PROTEIN, URINE: 17 MG/DL
URINE TOTAL PROTEIN CREATININE RATIO: 0.44 (ref 0–0.2)
WBC OTHER # BLD: 20.8 K/UL (ref 3.5–11.3)

## 2023-05-30 PROCEDURE — 1220000000 HC SEMI PRIVATE OB R&B

## 2023-05-30 PROCEDURE — 84156 ASSAY OF PROTEIN URINE: CPT

## 2023-05-30 PROCEDURE — 6360000002 HC RX W HCPCS

## 2023-05-30 PROCEDURE — 85025 COMPLETE CBC W/AUTO DIFF WBC: CPT

## 2023-05-30 PROCEDURE — 0503F POSTPARTUM CARE VISIT: CPT | Performed by: OBSTETRICS & GYNECOLOGY

## 2023-05-30 PROCEDURE — 2580000003 HC RX 258

## 2023-05-30 PROCEDURE — 80053 COMPREHEN METABOLIC PANEL: CPT

## 2023-05-30 PROCEDURE — 59400 OBSTETRICAL CARE: CPT | Performed by: OBSTETRICS & GYNECOLOGY

## 2023-05-30 PROCEDURE — 82570 ASSAY OF URINE CREATININE: CPT

## 2023-05-30 PROCEDURE — 6370000000 HC RX 637 (ALT 250 FOR IP)

## 2023-05-30 PROCEDURE — 36415 COLL VENOUS BLD VENIPUNCTURE: CPT

## 2023-05-30 RX ORDER — ENOXAPARIN SODIUM 100 MG/ML
40 INJECTION SUBCUTANEOUS DAILY
Qty: 16.8 ML | Refills: 0 | Status: SHIPPED | OUTPATIENT
Start: 2023-05-30 | End: 2023-07-11

## 2023-05-30 RX ORDER — FLUCONAZOLE 150 MG/1
150 TABLET ORAL ONCE
Qty: 2 TABLET | Refills: 0 | Status: SHIPPED | OUTPATIENT
Start: 2023-05-30 | End: 2023-05-31 | Stop reason: HOSPADM

## 2023-05-30 RX ADMIN — SODIUM CHLORIDE, PRESERVATIVE FREE 10 ML: 5 INJECTION INTRAVENOUS at 09:45

## 2023-05-30 RX ADMIN — DOCUSATE SODIUM 50 MG AND SENNOSIDES 8.6 MG 2 TABLET: 8.6; 5 TABLET, FILM COATED ORAL at 09:45

## 2023-05-30 RX ADMIN — DOCUSATE SODIUM 50 MG AND SENNOSIDES 8.6 MG 2 TABLET: 8.6; 5 TABLET, FILM COATED ORAL at 21:35

## 2023-05-30 RX ADMIN — ENOXAPARIN SODIUM 40 MG: 40 INJECTION SUBCUTANEOUS at 09:44

## 2023-05-30 NOTE — TELEPHONE ENCOUNTER
Pt notified of + candida- and diflucan 150mg PO x 1 now will be sent to pt pharm. Pt delivered 5/29/23.

## 2023-05-30 NOTE — CARE COORDINATION
SW reviewed chart - no social concerns identified at this time. SW will remain available if needs arise.

## 2023-05-30 NOTE — FLOWSHEET NOTE
Transported via w/c with personal belongings to postpartum. Bedside report given to [facility].

## 2023-05-30 NOTE — CARE COORDINATION
CASE MANAGEMENT POST-PARTUM TRANSITIONAL CARE PLAN    37 weeks gestation of pregnancy [Z3A.37]    OB Provider: Dr. Thad Powell met w/ Jacky Casas and FOB/BF Doreen Garcia at her bedside to discuss DCP. She is S/P  on 23 @ 37w1d at 1958 of female infant    Writer verified address/phone number correct on facesheet. She states she lives with her BF/FOB Gwen Christian and 8 yr old son. Jacky Casas verbalized no difficulties with transportation to and from doctors appointments or with paying for medications upon discharge home. [employer] insurance correct. Writer notified Dileep Carl they have 30 days from date of birth to add  to insurance policy. They verbalized understanding. Dileep Carl confirmed a safe place for infant to sleep at home. Infant name on BC: Arnav Hogan. Infant PCP JUSTIN Masters @ [facility] Peds.      DME: none  HOME CARE: none    Anticipate DC home in private vehicle of couplet 23    Readmission Risk              Risk of Unplanned Readmission:  4

## 2023-05-30 NOTE — LACTATION NOTE
INPATIENT CONSULT    Maternal /para status:     Maternal breastfeeding history:   Nursed her 5 y/o son for 1.5 years. Denies feeding difficulties. Current pregnancy:    Gestational age: 37.1 weeks     C/section or vaginal delivery:       Birth weight: 2980  6# 9oz      SGA/LGA/IUGR/diabetes during pregnancy: na      Plan for feeding: breast      Breast pump at home: yes  Needs medical necessity form:      Assessment of breastfeeding:  Baby sleeping swaddled in crib. Benefits of STS discussed and encouraged. Call for positioning and latch assistance.              Reviewed:   - Breastfeeding packet  - Expectations for normal  feeding   - Hand expression  - Deep latch/milk transfer  - Cues for feeding (early/late)     Encouraged:   - Frequent skin to skin with mom or dad  - Frequent attempts to feed  - Calling for assistance as needed

## 2023-05-30 NOTE — L&D DELIVERY NOTE
Marcelo Yates, Baby Girl Amanda Marcelo [[id number]]      Labor Events     Labor: No   Steroids: None  Cervical Ripening Date/Time:      Antibiotics Received during Labor: Yes  Rupture Identifier: Sac 1  Rupture Date/Time:  23 05:30:00   Rupture Type: SROM  Fluid Color: Bloody Show  Fluid Odor: None  Fluid Volume: Moderate  Augmentation: Oxytocin  Labor Complications: None              Anesthesia    Method: None       Delivery Details      Delivery Date: 23 Delivery Time: 19:56:56                 Carlock Presentation    Presentation: Vertex       Shoulder Dystocia    Shoulder Dystocia Present?: No       Assisted Delivery Details    Forceps Attempted?: No  Vacuum Extractor Attempted?: No                           Cord    Vessels: 3 Vessels  Complications: None  Delayed Cord Clamping?: Yes  Cord Clamped Date/Time: 2023 19:58:28  Cord Blood Disposition: Lab  Gases Sent?: Yes              Placenta    Date/Time: 2023 20:01:24  Removal: Spontaneous  Appearance: Intact  Disposition: Discarded       Lacerations    Episiotomy: None  Number of Repair Packets: 1       Vaginal Counts    Initial Count Personnel: DR. ADDISON  Initial Count Verified By: DR. RICHARDS  Intial Sponge Count: Correct Intial Needles Count: Correct Intial Instruments Count: Correct   Final Sponges Count: Correct Final Needles  Count: Correct Final Instruments Count: Correct   Final Count Personnel: DR. ADDISON  Final Count Verified By: DR. RICHARDS  Accurate Final Count?: Yes       Blood Loss  Mother: Colonel Webb #[id number]     Start of Mother's Information      Delivery Blood Loss  23 0756 - 23      None                 End of Mother's Information  Mother: Colonel Webb #[id number]                Delivery Providers    Delivering clinician: Jean Huber DO     Provider Role     Obstetrician    Apurva Diane, CARLITO Primary Nurse     Primary  Nurse     NICU Nurse     Neonatologist     Anesthesiologist     Nurse Anesthetist

## 2023-05-30 NOTE — TELEPHONE ENCOUNTER
----- Message from JUSTIN Chowdary - CNP sent at 5/30/2023  7:52 AM EDT -----  +GBS- treat in labor per protocol  + candida- diflucan 150mg PO x 1 now

## 2023-05-31 VITALS
DIASTOLIC BLOOD PRESSURE: 87 MMHG | SYSTOLIC BLOOD PRESSURE: 139 MMHG | OXYGEN SATURATION: 97 % | TEMPERATURE: 98.6 F | RESPIRATION RATE: 18 BRPM | HEART RATE: 64 BPM

## 2023-05-31 PROCEDURE — 6360000002 HC RX W HCPCS

## 2023-05-31 PROCEDURE — 6370000000 HC RX 637 (ALT 250 FOR IP)

## 2023-05-31 RX ADMIN — DOCUSATE SODIUM 50 MG AND SENNOSIDES 8.6 MG 2 TABLET: 8.6; 5 TABLET, FILM COATED ORAL at 08:56

## 2023-05-31 RX ADMIN — ENOXAPARIN SODIUM 40 MG: 40 INJECTION SUBCUTANEOUS at 08:56

## 2023-05-31 NOTE — LACTATION NOTE
Lactation round made. Mother reports breastfeeding is going well. Denies painful latch, reports strong tug, if pinchy, will readjust. Mother has a breast pump. Reviewed D/C instructions with parents. Observed mother with latching, good positioning. Baby self latched, swallows present, latch appears slightly shallow but mother reports strong tug. Encouraged to call out.

## 2023-05-31 NOTE — PROGRESS NOTES
CLINICAL PHARMACY NOTE: MEDS TO BEDS    Total # of Prescriptions Filled: 3   The following medications were delivered to the patient:  SENEXON   TYLENOL 500MG   IBU 600MG     Additional Documentation:     [address], [address],
Date: 2023         Time: 2:33 PM    Stew Shah is a 32 y.o. female at 42w4d  The patient was seen and examined. Her pain  is well controlled. SROM    Blood pressure 126/82, pulse 62, temperature 98.3 °F (36.8 °C), temperature source Oral, resp. rate 17, last menstrual period 2022, SpO2 100 %, not currently breastfeeding. FHT: 130, moderate Variability  Accels: present  Decels: absent  Contractions: regular, every 3-4 minutes  Cervical Exam: deferred  Pitocin @ 0 mu/min    Membranes Are: Ruptured clear fluid  Scalp Electrode in place: No  Intrauterine Pressure Catheter in Place: No    Assessment/Plan:  1. Stew Shah is a 32 y.o. female  37w1d   2. GBS positive   -Pen G for GBS prophylaxis  3. Continue management of augemented labor, SROM              All questions answered. Pt. Agreeable to plan of care.
Labor Progress Note    Domo Be is a 32 y.o. female  at 42w4d  The patient was seen and examined. Her pain is well controlled. SVE performed at bedside and patient tolerated procedure well. She reports fetal movement is present, complains of contractions, complains of loss of fluid, denies vaginal bleeding.        Vital Signs:  Vitals:    23 1430 23 1515 23 1600 23 1630   BP:  137/85 (!) 135/99    Pulse:  67 76    Resp:       Temp: 98.4 °F (36.9 °C)   98.2 °F (36.8 °C)   TempSrc: Oral   Oral   SpO2:             FHT: 130, moderate variability, accelerations present, decelerations absent  Contractions: regular, every 3-5 minutes    Chaperone for Intimate Exam: Chaperone was present for entire exam, Chaperone Name: Karis Germain RN   Cervical Exam: 3 cm dilated, 50 effaced, -1 station  Pitocin: @ 0 mu/min    Membranes: Ruptured clear fluid  Scalp Electrode in place: absent  Intrauterine Pressure Catheter in Place: absent    Interventions: SVE    Assessment/Plan:  Domo Be is a 32 y.o. female  at 37w1d admitted for Spontaneous Labor with SROM    - GBS positive, Pen G for GBS prophylaxis   - VSS    - cEFM/TOCO: cat 1 FHT    - SROM (clear) @ 0530   - SVE unchanged from prior    - Pitocin per protocol ordered at this time and pending    - Pt no interested in any pain control at this time    - Continue current care    Attending updated and in agreement with plan    Pema Mclaughlin MD  Ob/Gyn Resident  2023, 1:49 PM
Labor Progress Note    Gallo Chen is a 32 y.o. female  at 42w4d  The patient was seen and examined. Her pain is not controlled. SVE performed at bedside 2/2 patient feeling more pressure, patient tolerated procedure well. She reports fetal movement is present, complains of contractions, complains of loss of fluid, denies vaginal bleeding.        Vital Signs:  Vitals:    23 1430 23 1515 23 1600 23 1630   BP:  137/85 (!) 135/99    Pulse:  67 76    Resp:       Temp: 98.4 °F (36.9 °C)   98.2 °F (36.8 °C)   TempSrc: Oral   Oral   SpO2:             FHT: 130, moderate variability, accelerations present, decelerations absent  Contractions: regular, every 2 minutes    Chaperone for Intimate Exam: Chaperone was present for entire exam, Chaperone Name: Gary Hinton RN   Cervical Exam: 4 cm dilated, 70 effaced, -1 station  Pitocin: @ 2 mu/min    Membranes: Ruptured clear fluid  Scalp Electrode in place: absent  Intrauterine Pressure Catheter in Place: absent    Interventions: SVE    Assessment/Plan:  Gallo Chen is a 32 y.o. female  at 37w1d admitted for Spontaneous Labor with SROM    - GBS positive, Pen G for GBS prophylaxis   - VSS    - cEFM/TOCO: cat 1 FHT    - SROM (clear) @ 0530   - Pt no interested in any pain control at this time    - Pitocin per protocol    - Continue current care    Attending updated and in agreement with plan    Alexis Herrera MD  Ob/Gyn Resident  2023, 4:47 PM
Labor Progress Note    Sha Weiss is a 32 y.o. female  at 42w4d  The patient was seen and examined. Her pain is not controlled but patient does not desire pain medications at this time. SVE performed at bedside 2/2 patient feeling more pressure, patient tolerated procedure well. She reports fetal movement is present, complains of contractions, complains of loss of fluid, denies vaginal bleeding.        Vital Signs:  Vitals:    23 1600 23 1630 23 1800 23 1900   BP: (!) 135/99  (!) 152/94    Pulse: 76  77    Resp:       Temp:  98.2 °F (36.8 °C)  98.5 °F (36.9 °C)   TempSrc:  Oral  Oral   SpO2:             FHT: 130, moderate variability, accelerations present, decelerations absent  Contractions: regular, every 2 minutes    Chaperone for Intimate Exam: Chaperone was present for entire exam, Chaperone Name: Meenakshi Blancas RN   Cervical Exam: 7 cm dilated, 90 effaced, 0 station  Pitocin: @ 4 mu/min    Membranes: Ruptured clear fluid  Scalp Electrode in place: absent  Intrauterine Pressure Catheter in Place: absent    Interventions: SVE    Assessment/Plan:  Sha Weiss is a 32 y.o. female  at 37w1d admitted for Spontaneous Labor with SROM    - GBS positive, Pen G for GBS prophylaxis   - VSS    - cEFM/TOCO: cat 1 FHT    - SROM (clear) @ 0530   - Pt no interested in any pain control at this time    - S/p morphine/compazine x1   - Pitocin per protocol    - Continue current care    Attending updated and in agreement with plan, in route to hospital     Zabrina Keith MD  Ob/Gyn Resident  2023, 7:15 PM
POST PARTUM DAY # 1     Lauren White is a 32 y.o. female  This patient was seen & examined today.  on 23    Her pregnancy was complicated by:   Patient Active Problem List   Diagnosis    Carpal tunnel syndrome on right    Hypercoagulable state ([facility] [address]    MTHFR mutation    Hx of DVT of leg     Subchorionic hematoma: RESOLVED 2022    FHx of DVT    Rh+/RI/GBSpos     23 F Apg 8/9 Wt 6#9     Gestational hypertension (G2)       Today she is doing well without any chief complaint. Her lochia is light. She denies chest pain, shortness of breath, headache, lightheadedness and blurred vision. She is breast feeding and she denies any breast tenderness. She is ambulating well. Her voiding pattern is normal. I reviewed signs and symptoms of post partum depression with the patient, she currently denies any of these symptoms. She is tolerating solids.      Vital Signs:  Vitals:    23 2201 23 2216 23 2253 23 0330   BP: 135/84 137/81 (!) 143/79 126/82   Pulse: 58 57 71 56   Resp: 16 16 16 16   Temp:  99 °F (37.2 °C) 98.1 °F (36.7 °C) 98 °F (36.7 °C)   TempSrc:  Oral Oral Oral   SpO2:  97% 100% 100%       Physical Exam:  General:  no apparent distress, alert and cooperative  Neurologic:  alert, oriented, normal speech, no focal findings or movement disorder noted  Lungs:  No increased work of breathing, good air exchange, clear to auscultation bilaterally, no crackles or wheezing  Heart:  Normal apical impulse, regular rate and rhythm, normal S1 and S2, no S3 or S4, and no murmur noted    Abdomen: abdomen soft, non-distended, non-tender  Fundus: non-tender, firm, below umbilicus  Extremities:  no calf tenderness, non edematous    Lab:  Lab Results   Component Value Date    HGB 12.3 2023     Lab Results   Component Value Date    HCT 36.9 2023       Assessment/Plan:  Lauren White is a [id number] PPD # 1 s/p    - Doing well, VSS   - female infant in [facility]   -
POST PARTUM DAY # 2    Lauren White is a 32 y.o. female  This patient was seen & examined today. Her pregnancy was complicated by:   Patient Active Problem List   Diagnosis    Carpal tunnel syndrome on right    Hypercoagulable state ([address]    MTHFR mutation    Hx of DVT of leg     Subchorionic hematoma: RESOLVED 2022    FHx of DVT    Rh+/RI/GBSpos     23 F Apg 8/9 Wt 6#9     PreE w/o SF (G2)    GBS (group B Streptococcus carrier), +RV culture, currently pregnant       Today she is doing well without any chief complaint. Her lochia is light. She denies chest pain, shortness of breath, headache, lightheadedness, and blurred vision. She is  breast feeding and she denies any breast tenderness. She is ambulating well. Her voiding pattern is normal. I reviewed signs and symptoms of post partum depression with the patient, she currently denies any of these symptoms. She is tolerating solids.      Vital Signs:  Vitals:    23 0835 23 0903 23 1201 23 1601   BP: 113/82 113/82 117/78 129/83   Pulse: 56 56 73 67   Resp: 16 16 16 16   Temp: 97 °F (36.1 °C) 97 °F (36.1 °C) 97 °F (36.1 °C) 98.1 °F (36.7 °C)   TempSrc: Oral Oral Oral Oral   SpO2: 98% 98% 97% 97%         Physical Exam:  General:  no apparent distress, alert, and cooperative  Neurologic:  alert, oriented, normal speech, no focal findings or movement disorder noted  Lungs:  No increased work of breathing, good air exchange, clear to auscultation bilaterally, no crackles or wheezing  Heart:  regular rate and rhythm    Abdomen: abdomen soft, non-distended, non-tender  Fundus: non-tender, normal size, firm, below umbilicus  Extremities:  no calf tenderness, non edematous    Lab:  Lab Results   Component Value Date    HGB 12.3 2023     Lab Results   Component Value Date    HCT 36.9 2023       Assessment/Plan:  Lauren White is a [id number] PPD # 2 s/p    - Doing well, VSS   - female infant in [facility]   -
irritability, sadness, crying, or anxiety. Postpartum depression is more severe, occurring in 10%-20% of new moms. If you experience such symptoms, contact your doctor. Consider joining a support group for new mothers. You can get encouragement and learn parenting strategies. Follow-up   Schedule a follow-up appointment as directed by your doctor. Call Your Doctor If Any of the Following Occurs   It is important for you to monitor your recovery once you leave the hospital. That way, you can alert your doctor to any problems immediately. If any of the following occurs, call your doctor:   Signs of infection, including fever and chills    Increased bleeding: soaking more than one sanitary pad an hour    Wounds that become red, swollen or drain pus    Vaginal discharge that smells foul    New pain, swelling, or tenderness in your legs    Pain that you can't control with the medications you've been given    Pain, burning, urgency or frequency of urination, or persistent bleeding in the urine    Cough, shortness of breath, or chest pain    Depression, suicidal thoughts, or feelings of harming your baby    Breasts that are hot, red and accompanied by fever    Any cracking or bleeding from the nipple or areola (the dark-colored area of the breast)      In case of an emergency, call 911 immediately. Home care, Restrictions,  Follow up Care, and birth control review completed    RTO 2-3 weeks    Secondary Smoke risks and Sudden Infant Death Syndrome were reviewed with recommendations. Cessation options discussed. Signs and Symptoms of Post Partum Depression were reviewed. The patient is to call if any occur. Signs and symptoms of Mastitis were reviewed. The patient is to call if any occur for follow up.       Attending's Name:  Electronically signed by Margarito Kathleen DO on 5/30/2023 at 9:04 AM

## 2023-06-01 ENCOUNTER — TELEPHONE (OUTPATIENT)
Dept: PRIMARY CARE CLINIC | Age: 28
End: 2023-06-01

## 2023-06-01 NOTE — TELEPHONE ENCOUNTER
Care Transitions Initial Follow Up Call    Outreach made within 2 business days of discharge: Yes    Patient: Loren Briggs Patient : 1995   MRN: [id number]  Reason for Admission: There are no discharge diagnoses documented for the most recent discharge. Discharge Date: 23       Spoke with: Left a voicemail    Discharge department/facility: [facility] Interactive Patient Contact:  Was patient able to fill all prescriptions: No: No answer  Was patient instructed to bring all medications to the follow-up visit: No: no answer  Is patient taking all medications as directed in the discharge summary?  No  Does patient understand their discharge instructions: No: no answer  Does patient have questions or concerns that need addressed prior to 7-14 day follow up office visit: no    Scheduled appointment with PCP within 7-14 days    Follow Up  Future Appointments   Date Time Provider Albert Holly   2023  8:45 AM [address]

## 2023-06-02 ENCOUNTER — TELEPHONE (OUTPATIENT)
Dept: PRIMARY CARE CLINIC | Age: 28
End: 2023-06-02

## 2023-06-02 NOTE — TELEPHONE ENCOUNTER
Care Transitions Initial Follow Up Call     Outreach made within 2 business days of discharge: Yes     Patient: Jesús Herrera      Patient : 1995   MRN: [id number]     Reason for Admission: There are no discharge diagnoses documented for the most recent discharge. Discharge Date: 23                        Spoke with: Left a voicemail     Discharge department/facility: [facility] Interactive Patient Contact:  Was patient able to fill all prescriptions: No: No answer  Was patient instructed to bring all medications to the follow-up visit: No: no answer  Is patient taking all medications as directed in the discharge summary?  No  Does patient understand their discharge instructions: No: no answer  Does patient have questions or concerns that need addressed prior to 7-14 day follow up office visit: no     Scheduled appointment with PCP within 7-14 days     Follow Up         Future Appointments   Date Time Provider Albert Holly   2023  8:45 AM [address]

## 2023-06-08 ENCOUNTER — TELEPHONE (OUTPATIENT)
Dept: OBGYN CLINIC | Age: 28
End: 2023-06-08

## 2023-06-08 NOTE — TELEPHONE ENCOUNTER
Letter created in epic per pt request ( on 6/8) for letter for proof of delivery , & approx time off work for employer.

## 2023-06-12 PROBLEM — O41.8X90 SUBCHORIONIC HEMATOMA, ANTEPARTUM: Status: RESOLVED | Noted: 2022-11-14 | Resolved: 2023-06-12

## 2023-06-12 PROBLEM — O46.8X9 SUBCHORIONIC HEMATOMA, ANTEPARTUM: Status: RESOLVED | Noted: 2022-11-14 | Resolved: 2023-06-12

## 2023-06-12 PROBLEM — O99.820 GBS (GROUP B STREPTOCOCCUS CARRIER), +RV CULTURE, CURRENTLY PREGNANT: Status: RESOLVED | Noted: 2023-05-30 | Resolved: 2023-06-12

## 2023-07-11 ENCOUNTER — OFFICE VISIT (OUTPATIENT)
Dept: OBGYN CLINIC | Age: 28
End: 2023-07-11

## 2023-07-11 VITALS
BODY MASS INDEX: 24.27 KG/M2 | WEIGHT: 151 LBS | SYSTOLIC BLOOD PRESSURE: 108 MMHG | HEIGHT: 66 IN | DIASTOLIC BLOOD PRESSURE: 60 MMHG

## 2023-07-11 PROBLEM — O14.93 PRE-ECLAMPSIA IN THIRD TRIMESTER: Status: RESOLVED | Noted: 2023-05-30 | Resolved: 2023-07-11

## 2023-07-11 NOTE — PROGRESS NOTES
Maryann Castro is a 32 y.o. female    Delivery Information:  Delivery Date: 2023    Sex of Baby: female  Type of Delivery: Vaginal  Delivering Physician: Dr Micky Mejia Questions:  Yes Do you Smoke? If yes PPD is vape a few times/ day  Yes Do you intake caffeine? No Do you use street drugs? No Do you consume alcohol? Yes Are breast feeding? No Are you bottle feeding? No Are you having any problems with your breasts? (lumps, discharge, asymmetry, or redness)  No Are you having any problems with bowel movements or urination? No Are you having any vaginal discharge/itching/ or burning?  no Are you getting help and support with the baby? No Have you had intercourse since your delivery? No If you had intercourse did you use condoms? NA Have you had a menstrual cycle since delivery? Date: NA  No Do you have any questions that need to be addressed today? How long did you bleed after your delivery? 4-5 Weeks  What are your plans besides condoms for family planning? Undecided. Discussed nonhormonal contraception- paraguard IUD, condoms, natural family planning due to patient's hx of DVT  Who lives at home with you and your baby? Her son Celeste Leroy, 8years old    The patient was counseled on the risks of tobacco abuse and the harm it may cause to the patient and her  baby as well as others in the household from secondary smoke exposure. The patient was counseled on the risks of potential respiratory problems, cancers, and sudden infant death syndrome as well as other co-morbidities. These were discussed in detail. I reviewed cessation options and plans. The patient was counseled on smoking outdoors with appropriate covers of clothing and hair. She was counseled on hand washing prior to holding or picking up the baby. The patient had her questions answered in regards to the baby and was instructed to follow up with her pediatrician.  She had reviewed with her safe sleep

## 2025-04-09 ENCOUNTER — TELEPHONE (OUTPATIENT)
Dept: OBGYN CLINIC | Age: 30
End: 2025-04-09

## 2025-04-09 DIAGNOSIS — N92.6 MISSED MENSES: Primary | ICD-10-CM

## 2025-04-09 NOTE — TELEPHONE ENCOUNTER
Pt with LMP 2/22/25.  +UPT at home.  [facility]  Order in [employer].  Pt currently taking PNV.  Pt verbalized understanding.

## 2025-04-10 ENCOUNTER — HOSPITAL ENCOUNTER (OUTPATIENT)
Age: 30
Discharge: HOME | End: 2025-04-10
Payer: COMMERCIAL

## 2025-04-10 ENCOUNTER — RESULTS FOLLOW-UP (OUTPATIENT)
Dept: OBGYN CLINIC | Age: 30
End: 2025-04-10

## 2025-04-10 DIAGNOSIS — Z34.90 EARLY STAGE OF PREGNANCY: Primary | ICD-10-CM

## 2025-04-10 DIAGNOSIS — N92.6 MISSED MENSES: ICD-10-CM

## 2025-04-10 LAB — B-HCG SERPL EIA 3RD IS-ACNC: ABNORMAL MIU/ML (ref 0–7)

## 2025-04-10 PROCEDURE — 36415 COLL VENOUS BLD VENIPUNCTURE: CPT

## 2025-04-10 PROCEDURE — 84702 CHORIONIC GONADOTROPIN TEST: CPT

## 2025-04-11 NOTE — TELEPHONE ENCOUNTER
----- Message from JUSTIN Crane NP sent at 4/10/2025  4:02 PM EDT -----  + quant  Determine LMP  Order PNVs  Patient will need scheduled for NOB

## 2025-04-14 NOTE — TELEPHONE ENCOUNTER
[facility] Result Note Patient Contact Communication   [address] [place] [address]      04/14/25   12:37 PM     Patients Name: Linda MCCAIN   Medical Record Number: [id number]   Contact Serial Number: [id number]  YOB: 1995       Patients Phone Number: [phone]     Description of Recommendations:  The patient was contacted and her identity was confirmed with two of the specific identifiers listed above.  The information regarding her recent testing results and provider recommendations were reviewed with her in detail and all questions were answered. LMP 2/22  Recent labs/Cultures/ Imaging Studies/Pathology reports and Urinalysis results are included:      Recommendations given by provider:  ----- Message from JUSTIN Crane NP sent at 4/10/2025  4:02 PM EDT -----  + quant  Determine LMP  Order PNVs  Patient will need scheduled for NOB       The patient verbalized understanding of the information above and the recommendation by the provider. She will contact her PCP if any other questions arise or contact our office for any other clarifications.        Electronically signed by Taniya Roman on 4/14/2025 at 12:37 PM

## 2025-04-18 ENCOUNTER — HOSPITAL ENCOUNTER (OUTPATIENT)
Age: 30
Setting detail: OBSERVATION
Discharge: HOME | End: 2025-04-19
Attending: EMERGENCY MEDICINE | Admitting: STUDENT IN AN ORGANIZED HEALTH CARE EDUCATION/TRAINING PROGRAM
Payer: COMMERCIAL

## 2025-04-18 DIAGNOSIS — Z86.718 HX OF DEEP VENOUS THROMBOSIS: ICD-10-CM

## 2025-04-18 DIAGNOSIS — O88.211 PULMONARY EMBOLISM AFFECTING PREGNANCY IN FIRST TRIMESTER: Primary | ICD-10-CM

## 2025-04-18 DIAGNOSIS — Z3A.08 8 WEEKS GESTATION OF PREGNANCY: ICD-10-CM

## 2025-04-18 LAB
ALBUMIN SERPL-MCNC: 4.2 G/DL (ref 3.5–5.2)
ALBUMIN/GLOB SERPL: 1.5 {RATIO} (ref 1–2.5)
ALP SERPL-CCNC: 68 U/L (ref 35–104)
ALT SERPL-CCNC: 21 U/L (ref 10–35)
ANION GAP SERPL CALCULATED.3IONS-SCNC: 11 MMOL/L (ref 9–16)
AST SERPL-CCNC: 20 U/L (ref 10–35)
BASOPHILS # BLD: 0.05 K/UL (ref 0–0.2)
BASOPHILS NFR BLD: 0 % (ref 0–2)
BILIRUB SERPL-MCNC: 0.4 MG/DL (ref 0–1.2)
BNP SERPL-MCNC: 53 PG/ML (ref 0–125)
BUN SERPL-MCNC: 12 MG/DL (ref 6–20)
CALCIUM SERPL-MCNC: 9.5 MG/DL (ref 8.6–10.4)
CHLORIDE SERPL-SCNC: 105 MMOL/L (ref 98–107)
CO2 SERPL-SCNC: 20 MMOL/L (ref 20–31)
CREAT SERPL-MCNC: 0.7 MG/DL (ref 0.6–0.9)
D DIMER PPP FEU-MCNC: 2.18 UG/ML FEU (ref 0–0.57)
EOSINOPHIL # BLD: 0.49 K/UL (ref 0–0.44)
EOSINOPHILS RELATIVE PERCENT: 4 % (ref 1–4)
ERYTHROCYTE [DISTWIDTH] IN BLOOD BY AUTOMATED COUNT: 11.8 % (ref 11.8–14.4)
GFR, ESTIMATED: >90 ML/MIN/1.73M2
GLUCOSE SERPL-MCNC: 107 MG/DL (ref 74–99)
HCT VFR BLD AUTO: 38.9 % (ref 36.3–47.1)
HGB BLD-MCNC: 13.2 G/DL (ref 11.9–15.1)
IMM GRANULOCYTES # BLD AUTO: 0.05 K/UL (ref 0–0.3)
IMM GRANULOCYTES NFR BLD: 0 %
INR PPP: 1.1
LYMPHOCYTES NFR BLD: 1.83 K/UL (ref 1.1–3.7)
LYMPHOCYTES RELATIVE PERCENT: 13 % (ref 24–43)
MCH RBC QN AUTO: 30.8 PG (ref 25.2–33.5)
MCHC RBC AUTO-ENTMCNC: 33.9 G/DL (ref 28.4–34.8)
MCV RBC AUTO: 90.9 FL (ref 82.6–102.9)
MONOCYTES NFR BLD: 0.81 K/UL (ref 0.1–1.2)
MONOCYTES NFR BLD: 6 % (ref 3–12)
NEUTROPHILS NFR BLD: 77 % (ref 36–65)
NEUTS SEG NFR BLD: 10.5 K/UL (ref 1.5–8.1)
NRBC BLD-RTO: 0 PER 100 WBC
PARTIAL THROMBOPLASTIN TIME: 27.9 SEC (ref 23–36.5)
PLATELET # BLD AUTO: 211 K/UL (ref 138–453)
PMV BLD AUTO: 10.5 FL (ref 8.1–13.5)
POTASSIUM SERPL-SCNC: 3.9 MMOL/L (ref 3.7–5.3)
PROT SERPL-MCNC: 7 G/DL (ref 6.6–8.7)
PROTHROMBIN TIME: 14.1 SEC (ref 11.7–14.9)
RBC # BLD AUTO: 4.28 M/UL (ref 3.95–5.11)
SODIUM SERPL-SCNC: 136 MMOL/L (ref 136–145)
TROPONIN I SERPL HS-MCNC: <6 NG/L (ref 0–14)
WBC OTHER # BLD: 13.7 K/UL (ref 3.5–11.3)

## 2025-04-18 PROCEDURE — 85379 FIBRIN DEGRADATION QUANT: CPT

## 2025-04-18 PROCEDURE — 84484 ASSAY OF TROPONIN QUANT: CPT

## 2025-04-18 PROCEDURE — 85610 PROTHROMBIN TIME: CPT

## 2025-04-18 PROCEDURE — 85025 COMPLETE CBC W/AUTO DIFF WBC: CPT

## 2025-04-18 PROCEDURE — 85730 THROMBOPLASTIN TIME PARTIAL: CPT

## 2025-04-18 PROCEDURE — 83880 ASSAY OF NATRIURETIC PEPTIDE: CPT

## 2025-04-18 PROCEDURE — 93005 ELECTROCARDIOGRAM TRACING: CPT

## 2025-04-18 PROCEDURE — 99285 EMERGENCY DEPT VISIT HI MDM: CPT

## 2025-04-18 PROCEDURE — 80053 COMPREHEN METABOLIC PANEL: CPT

## 2025-04-18 ASSESSMENT — PAIN SCALES - GENERAL: PAINLEVEL_OUTOF10: 7

## 2025-04-18 ASSESSMENT — PAIN - FUNCTIONAL ASSESSMENT: PAIN_FUNCTIONAL_ASSESSMENT: 0-10

## 2025-04-18 ASSESSMENT — PAIN DESCRIPTION - LOCATION: LOCATION: BACK

## 2025-04-19 ENCOUNTER — APPOINTMENT (OUTPATIENT)
Dept: ULTRASOUND IMAGING | Age: 30
End: 2025-04-19
Payer: COMMERCIAL

## 2025-04-19 ENCOUNTER — APPOINTMENT (OUTPATIENT)
Dept: CT IMAGING | Age: 30
End: 2025-04-19
Payer: COMMERCIAL

## 2025-04-19 ENCOUNTER — APPOINTMENT (OUTPATIENT)
Dept: VASCULAR LAB | Age: 30
End: 2025-04-19
Attending: INTERNAL MEDICINE
Payer: COMMERCIAL

## 2025-04-19 ENCOUNTER — APPOINTMENT (OUTPATIENT)
Age: 30
End: 2025-04-19
Attending: INTERNAL MEDICINE
Payer: COMMERCIAL

## 2025-04-19 VITALS
RESPIRATION RATE: 16 BRPM | DIASTOLIC BLOOD PRESSURE: 60 MMHG | BODY MASS INDEX: 30.18 KG/M2 | TEMPERATURE: 98.1 F | HEART RATE: 89 BPM | WEIGHT: 187 LBS | SYSTOLIC BLOOD PRESSURE: 95 MMHG | OXYGEN SATURATION: 97 %

## 2025-04-19 PROBLEM — O88.211 PULMONARY EMBOLISM AFFECTING PREGNANCY IN FIRST TRIMESTER: Status: ACTIVE | Noted: 2025-04-19

## 2025-04-19 LAB
ECHO AO ASC DIAM: 3 CM
ECHO AO ROOT DIAM: 2.9 CM
ECHO AV AREA PEAK VELOCITY: 2.2 CM2
ECHO AV AREA VTI: 2.1 CM2
ECHO AV MEAN GRADIENT: 5 MMHG
ECHO AV MEAN VELOCITY: 1.1 M/S
ECHO AV PEAK GRADIENT: 8 MMHG
ECHO AV PEAK VELOCITY: 1.4 M/S
ECHO AV VELOCITY RATIO: 0.79
ECHO AV VTI: 28.6 CM
ECHO EST RA PRESSURE: 3 MMHG
ECHO LA AREA 2C: 17.4 CM2
ECHO LA AREA 4C: 16.6 CM2
ECHO LA DIAMETER: 3.3 CM
ECHO LA MAJOR AXIS: 5.4 CM
ECHO LA MINOR AXIS: 4.9 CM
ECHO LA TO AORTIC ROOT RATIO: 1.14
ECHO LA VOL BP: 48 ML (ref 22–52)
ECHO LA VOL MOD A2C: 53 ML (ref 22–52)
ECHO LA VOL MOD A4C: 40 ML (ref 22–52)
ECHO LV E' LATERAL VELOCITY: 11.9 CM/S
ECHO LV E' SEPTAL VELOCITY: 11.3 CM/S
ECHO LV EDV 3D: 118 ML
ECHO LV EF PHYSICIAN: 57 %
ECHO LV EJECTION FRACTION 3D: 57 %
ECHO LV ESV 3D: 51 ML
ECHO LV FRACTIONAL SHORTENING: 23 % (ref 28–44)
ECHO LV INTERNAL DIMENSION DIASTOLIC: 4.7 CM (ref 3.9–5.3)
ECHO LV INTERNAL DIMENSION SYSTOLIC: 3.6 CM
ECHO LV IVSD: 0.7 CM (ref 0.6–0.9)
ECHO LV MASS 2D: 122.3 G (ref 67–162)
ECHO LV MASS 3D: 185 G
ECHO LV POSTERIOR WALL DIASTOLIC: 0.9 CM (ref 0.6–0.9)
ECHO LV RELATIVE WALL THICKNESS RATIO: 0.38
ECHO LVOT AREA: 2.8 CM2
ECHO LVOT AV VTI INDEX: 0.76
ECHO LVOT DIAM: 1.9 CM
ECHO LVOT MEAN GRADIENT: 3 MMHG
ECHO LVOT PEAK GRADIENT: 5 MMHG
ECHO LVOT PEAK VELOCITY: 1.1 M/S
ECHO LVOT SV: 61.2 ML
ECHO LVOT VTI: 21.6 CM
ECHO MV A VELOCITY: 0.59 M/S
ECHO MV AREA VTI: 2.3 CM2
ECHO MV E DECELERATION TIME (DT): 214 MS
ECHO MV E VELOCITY: 0.83 M/S
ECHO MV E/A RATIO: 1.41
ECHO MV E/E' LATERAL: 6.97
ECHO MV E/E' RATIO (AVERAGED): 7.16
ECHO MV E/E' SEPTAL: 7.35
ECHO MV LVOT VTI INDEX: 1.21
ECHO MV MAX VELOCITY: 1.1 M/S
ECHO MV MEAN GRADIENT: 2 MMHG
ECHO MV MEAN VELOCITY: 0.6 M/S
ECHO MV PEAK GRADIENT: 5 MMHG
ECHO MV VTI: 26.2 CM
ECHO PV MAX VELOCITY: 0.9 M/S
ECHO PV PEAK GRADIENT: 3 MMHG
ECHO RIGHT VENTRICULAR SYSTOLIC PRESSURE (RVSP): 23 MMHG
ECHO RV FREE WALL PEAK S': 13.8 CM/S
ECHO RV INTERNAL DIMENSION: 3.6 CM
ECHO RV TAPSE: 2 CM (ref 1.7–?)
ECHO TV REGURGITANT MAX VELOCITY: 2.24 M/S
ECHO TV REGURGITANT PEAK GRADIENT: 20 MMHG
EKG ATRIAL RATE: 94 BPM
EKG P AXIS: 69 DEGREES
EKG P-R INTERVAL: 122 MS
EKG Q-T INTERVAL: 336 MS
EKG QRS DURATION: 82 MS
EKG QTC CALCULATION (BAZETT): 420 MS
EKG R AXIS: 60 DEGREES
EKG T AXIS: 43 DEGREES
EKG VENTRICULAR RATE: 94 BPM

## 2025-04-19 PROCEDURE — G0378 HOSPITAL OBSERVATION PER HR: HCPCS

## 2025-04-19 PROCEDURE — 6360000004 HC RX CONTRAST MEDICATION

## 2025-04-19 PROCEDURE — 93970 EXTREMITY STUDY: CPT

## 2025-04-19 PROCEDURE — 93306 TTE W/DOPPLER COMPLETE: CPT

## 2025-04-19 PROCEDURE — 2580000003 HC RX 258: Performed by: NURSE PRACTITIONER

## 2025-04-19 PROCEDURE — 2580000003 HC RX 258: Performed by: INTERNAL MEDICINE

## 2025-04-19 PROCEDURE — 96361 HYDRATE IV INFUSION ADD-ON: CPT

## 2025-04-19 PROCEDURE — 76801 OB US < 14 WKS SINGLE FETUS: CPT

## 2025-04-19 PROCEDURE — 71260 CT THORAX DX C+: CPT

## 2025-04-19 PROCEDURE — 96376 TX/PRO/DX INJ SAME DRUG ADON: CPT

## 2025-04-19 PROCEDURE — 6360000002 HC RX W HCPCS

## 2025-04-19 PROCEDURE — 93306 TTE W/DOPPLER COMPLETE: CPT | Performed by: INTERNAL MEDICINE

## 2025-04-19 PROCEDURE — 93970 EXTREMITY STUDY: CPT | Performed by: STUDENT IN AN ORGANIZED HEALTH CARE EDUCATION/TRAINING PROGRAM

## 2025-04-19 PROCEDURE — 99238 HOSP IP/OBS DSCHRG MGMT 30/<: CPT | Performed by: STUDENT IN AN ORGANIZED HEALTH CARE EDUCATION/TRAINING PROGRAM

## 2025-04-19 PROCEDURE — 96374 THER/PROPH/DIAG INJ IV PUSH: CPT

## 2025-04-19 PROCEDURE — 96375 TX/PRO/DX INJ NEW DRUG ADDON: CPT

## 2025-04-19 PROCEDURE — 96372 THER/PROPH/DIAG INJ SC/IM: CPT

## 2025-04-19 RX ORDER — ACETAMINOPHEN 325 MG/1
650 TABLET ORAL EVERY 6 HOURS PRN
Status: DISCONTINUED | OUTPATIENT
Start: 2025-04-19 | End: 2025-04-19 | Stop reason: HOSPADM

## 2025-04-19 RX ORDER — SODIUM CHLORIDE 0.9 % (FLUSH) 0.9 %
10 SYRINGE (ML) INJECTION PRN
Status: DISCONTINUED | OUTPATIENT
Start: 2025-04-19 | End: 2025-04-19 | Stop reason: HOSPADM

## 2025-04-19 RX ORDER — ONDANSETRON 2 MG/ML
4 INJECTION INTRAMUSCULAR; INTRAVENOUS ONCE
Status: COMPLETED | OUTPATIENT
Start: 2025-04-19 | End: 2025-04-19

## 2025-04-19 RX ORDER — ONDANSETRON 4 MG/1
4 TABLET, ORALLY DISINTEGRATING ORAL EVERY 8 HOURS PRN
Status: DISCONTINUED | OUTPATIENT
Start: 2025-04-19 | End: 2025-04-19 | Stop reason: HOSPADM

## 2025-04-19 RX ORDER — POLYETHYLENE GLYCOL 3350 17 G/17G
17 POWDER, FOR SOLUTION ORAL DAILY PRN
Status: DISCONTINUED | OUTPATIENT
Start: 2025-04-19 | End: 2025-04-19 | Stop reason: HOSPADM

## 2025-04-19 RX ORDER — 0.9 % SODIUM CHLORIDE 0.9 %
1000 INTRAVENOUS SOLUTION INTRAVENOUS ONCE
Status: COMPLETED | OUTPATIENT
Start: 2025-04-19 | End: 2025-04-19

## 2025-04-19 RX ORDER — ACETAMINOPHEN 650 MG/1
650 SUPPOSITORY RECTAL EVERY 6 HOURS PRN
Status: DISCONTINUED | OUTPATIENT
Start: 2025-04-19 | End: 2025-04-19 | Stop reason: HOSPADM

## 2025-04-19 RX ORDER — ONDANSETRON 2 MG/ML
4 INJECTION INTRAMUSCULAR; INTRAVENOUS EVERY 6 HOURS PRN
Status: DISCONTINUED | OUTPATIENT
Start: 2025-04-19 | End: 2025-04-19 | Stop reason: HOSPADM

## 2025-04-19 RX ORDER — ASPIRIN 81 MG/1
81 TABLET, CHEWABLE ORAL DAILY
Status: DISCONTINUED | OUTPATIENT
Start: 2025-04-19 | End: 2025-04-19 | Stop reason: HOSPADM

## 2025-04-19 RX ORDER — MORPHINE SULFATE 4 MG/ML
2 INJECTION INTRAVENOUS ONCE
Status: COMPLETED | OUTPATIENT
Start: 2025-04-19 | End: 2025-04-19

## 2025-04-19 RX ORDER — SODIUM CHLORIDE 9 MG/ML
INJECTION, SOLUTION INTRAVENOUS PRN
Status: DISCONTINUED | OUTPATIENT
Start: 2025-04-19 | End: 2025-04-19 | Stop reason: HOSPADM

## 2025-04-19 RX ORDER — ENOXAPARIN SODIUM 100 MG/ML
80 INJECTION SUBCUTANEOUS 2 TIMES DAILY
Qty: 48 ML | Refills: 5 | Status: SHIPPED | OUTPATIENT
Start: 2025-04-19 | End: 2025-10-16

## 2025-04-19 RX ORDER — IOPAMIDOL 755 MG/ML
75 INJECTION, SOLUTION INTRAVASCULAR
Status: COMPLETED | OUTPATIENT
Start: 2025-04-19 | End: 2025-04-19

## 2025-04-19 RX ORDER — AZITHROMYCIN 500 MG/1
500 TABLET, FILM COATED ORAL DAILY
Qty: 3 TABLET | Refills: 0 | Status: SHIPPED | OUTPATIENT
Start: 2025-04-19 | End: 2025-04-22

## 2025-04-19 RX ORDER — MAGNESIUM SULFATE 1 G/100ML
1000 INJECTION INTRAVENOUS PRN
Status: DISCONTINUED | OUTPATIENT
Start: 2025-04-19 | End: 2025-04-19 | Stop reason: HOSPADM

## 2025-04-19 RX ORDER — SODIUM CHLORIDE 9 MG/ML
INJECTION, SOLUTION INTRAVENOUS CONTINUOUS
Status: DISCONTINUED | OUTPATIENT
Start: 2025-04-19 | End: 2025-04-19 | Stop reason: HOSPADM

## 2025-04-19 RX ORDER — MORPHINE SULFATE 4 MG/ML
4 INJECTION INTRAVENOUS
Refills: 0 | Status: COMPLETED | OUTPATIENT
Start: 2025-04-19 | End: 2025-04-19

## 2025-04-19 RX ORDER — SODIUM CHLORIDE 0.9 % (FLUSH) 0.9 %
5-40 SYRINGE (ML) INJECTION EVERY 12 HOURS SCHEDULED
Status: DISCONTINUED | OUTPATIENT
Start: 2025-04-19 | End: 2025-04-19 | Stop reason: HOSPADM

## 2025-04-19 RX ORDER — VITAMIN A, ASCORBIC ACID, CHOLECALCIFEROL, .ALPHA.-TOCOPHEROL ACETATE, DL-, THIAMINE MONONITRATE, RIBOFLAVIN, NIACINAMIDE, PYRIDOXINE HYDROCHLORIDE, FOLIC ACID, CYANOCOBALAMIN, CALCIUM CARBONATE, IRON, ZINC OXIDE, AND CUPRIC OXIDE 4000; 120; 400; 22; 1.84; 3; 20; 10; 1; 12; 200; 29; 25; 2 [IU]/1; MG/1; [IU]/1; [IU]/1; MG/1; MG/1; MG/1; MG/1; MG/1; UG/1; MG/1; MG/1; MG/1; MG/1
1 TABLET ORAL DAILY
Status: DISCONTINUED | OUTPATIENT
Start: 2025-04-19 | End: 2025-04-19 | Stop reason: HOSPADM

## 2025-04-19 RX ORDER — ACETAMINOPHEN 500 MG
1000 TABLET ORAL EVERY 6 HOURS PRN
Qty: 56 TABLET | Refills: 0 | Status: SHIPPED | OUTPATIENT
Start: 2025-04-19 | End: 2025-04-26

## 2025-04-19 RX ORDER — ENOXAPARIN SODIUM 100 MG/ML
1 INJECTION SUBCUTANEOUS 2 TIMES DAILY
Status: DISCONTINUED | OUTPATIENT
Start: 2025-04-19 | End: 2025-04-19 | Stop reason: HOSPADM

## 2025-04-19 RX ADMIN — ENOXAPARIN SODIUM 80 MG: 100 INJECTION SUBCUTANEOUS at 03:12

## 2025-04-19 RX ADMIN — SODIUM CHLORIDE: 0.9 INJECTION, SOLUTION INTRAVENOUS at 05:52

## 2025-04-19 RX ADMIN — MORPHINE SULFATE 4 MG: 4 INJECTION INTRAVENOUS at 03:23

## 2025-04-19 RX ADMIN — SODIUM CHLORIDE 1000 ML: 0.9 INJECTION, SOLUTION INTRAVENOUS at 06:40

## 2025-04-19 RX ADMIN — ONDANSETRON 4 MG: 2 INJECTION, SOLUTION INTRAMUSCULAR; INTRAVENOUS at 02:46

## 2025-04-19 RX ADMIN — MORPHINE SULFATE 2 MG: 4 INJECTION INTRAVENOUS at 02:46

## 2025-04-19 RX ADMIN — IOPAMIDOL 75 ML: 755 INJECTION, SOLUTION INTRAVENOUS at 01:35

## 2025-04-19 ASSESSMENT — ENCOUNTER SYMPTOMS
GASTROINTESTINAL NEGATIVE: 1
COUGH: 0
SHORTNESS OF BREATH: 1
WHEEZING: 0
STRIDOR: 0

## 2025-04-19 ASSESSMENT — PAIN SCALES - GENERAL
PAINLEVEL_OUTOF10: 9
PAINLEVEL_OUTOF10: 10

## 2025-04-19 NOTE — ED NOTES
ED to inpatient nurses report      Chief Complaint:  Chief Complaint   Patient presents with    Back Pain    Chest Pain     Present to ED from: Pt. Presents to the ED from home for chest pain and back pain. Pt states that for the last 3 days she has had this pain, but it has gotten progressively worse. Pt states that today it was the worst, so she wanted to be seen. Pt recently found out she was pregnant with a LMP of 2/22. Pt has an ob appointment scheduled next week to begin care. Pt complains of pain on the left side of her chest and the middle of her back. Pt expresses that her pain gets worse when taking a deep breath. Pt has a hx of dvt's in the past and was placed on blood thinners for a year before they were d/c'd. Pt placed on continuous cardiac monitor, bp and pulse ox. EKG completed, IV established, blood work drawn. Resp even and non labored. Will continue with plan of care.     MOA:     LOC: alert and orientated to name, place, date  Mobility: Independent  Oxygen Baseline: ra    Current needs required: 0   Pending ED orders: none  Present condition: stable    Why did the patient come to the ED? Chest and back pain  What is the plan? Bubble study, doppler study. OB consult  Any procedures or intervention occur? Ctpe, cxr, labs, fluids, pain meds.  Any safety concerns??    Mental Status:       Psych Assessment:   Psychosocial  Psychosocial (WDL): Within Defined Limits  Vital signs   Vitals:    04/19/25 0430 04/19/25 0445 04/19/25 0615 04/19/25 0630   BP: 96/60 (!) 95/59 109/73 95/60   Pulse: 88 91 95 89   Resp:       Temp:       TempSrc:       SpO2: 92% 97% 98% 97%   Weight:            Vitals:  Patient Vitals for the past 24 hrs:   BP Temp Temp src Pulse Resp SpO2 Weight   04/19/25 0630 95/60 -- -- 89 -- 97 % --   04/19/25 0615 109/73 -- -- 95 -- 98 % --   04/19/25 0445 (!) 95/59 -- -- 91 -- 97 % --   04/19/25 0430 96/60 -- -- 88 -- 92 % --   04/19/25 0415 108/79 -- -- 84 -- 99 % --   04/19/25 0400 118/77  -- -- 96 -- 100 % --   04/19/25 0330 113/85 -- -- 88 -- 99 % --   04/19/25 0251 122/77 -- -- 93 -- 99 % --   04/19/25 0235 -- -- -- 87 -- 99 % --   04/19/25 0015 119/83 -- -- 85 -- 96 % --   04/19/25 0000 109/80 -- -- 75 -- 98 % --   04/18/25 2330 122/80 -- -- 88 -- 100 % --   04/18/25 2315 123/80 -- -- 83 -- 98 % --   04/18/25 2300 (!) 134/99 -- -- 100 -- 100 % 84.8 kg (187 lb)   04/18/25 2257 -- 98.1 °F (36.7 °C) Oral -- -- -- --   04/18/25 2256 134/89 -- -- 91 16 99 % --      Visit Vitals  BP 95/60   Pulse 89   Temp 98.1 °F (36.7 °C) (Oral)   Resp 16   Wt 84.8 kg (187 lb)   SpO2 97%   BMI 30.18 kg/m²        LDAs:   Peripheral IV 04/18/25 Right Antecubital (Active)   Site Assessment Clean, dry & intact 04/18/25 2257   Line Status Blood return noted;Brisk blood return;Flushed;Normal saline locked 04/18/25 2257   Phlebitis Assessment No symptoms 04/18/25 2257   Infiltration Assessment 0 04/18/25 2257   Dressing Status New dressing applied 04/18/25 2257   Dressing Type Transparent 04/18/25 2257   Dressing Intervention New 04/18/25 2257       Ambulatory Status:  Presents to emergency department  because of falls (Syncope, seizure, or loss of consciousness): No, Age > 70: No, Altered Mental Status, Intoxication with alcohol or substance confusion (Disorientation, impaired judgment, poor safety awaremess, or inability to follow instructions): No, Impaired Mobility: Ambulates or transfers with assistive devices or assistance; Unable to ambulate or transer.: No, Nursing Judgement: No    Diagnosis:  DISPOSITION Admitted 04/19/2025 04:40:38 AM   Final diagnoses:   Pulmonary embolism affecting pregnancy in first trimester   8 weeks gestation of pregnancy        Consults:  IP CONSULT TO HOSPITALIST  IP CONSULT TO VASCULAR SURGERY  IP CONSULT TO OB GYN     Treatment Team:   Treatment Team:   Eliezer Rangel DO Frederick, Braden, RN Walsh, Matthew R, Radha Escamilla, JUSTIN - HERMES Mclaughlin, MD Suad Buckley,

## 2025-04-19 NOTE — CONSULTS
Division of Vascular Surgery        New Consult      Physician Requesting Consult:  Dr. Mejia    Reason for Consult:   Left lower lobe segmental pulmonary embolus    Chief Complaint:     Pleuritic chest pain    History of Present Illness:      Linda MCCAIN is a 29 y.o. woman who presents with complaints of chest and back pain ongoing for the last several days.  Patient states that her symptoms began approximately 4 days ago, and progressively worsened.  CT PE was performed upon presentation to emergency department which revealed small left lower lobe segmental pulmonary embolus without evidence of right heart strain, for which vascular surgery was consulted.  Patient states that she does have some shortness of breath, however, contributes this to taking shallow breaths due to her pleuritic chest pain.  She denies fever, chills, lower extremity pain, syncope.  Patient does have history of prior DVT, for which she was on anticoagulation for approximately 1 year.  She has not taken any anticoagulation for several years.  Denies any lower extremity swelling.  She does not smoke or regularly drink alcohol.  In the emergency department, she is hemodynamically stable, no ttachycardic, and saturating greater than 98% on room air.  Able to converse without conversational dyspnea.  Chest pain is not reproducible with palpation.  Of note, patient is also 8 weeks pregnant.    Medical History:     Past Medical History:   Diagnosis Date    Gestational hypertension (G2) 2023    History of DVT of lower extremity     MTHFR mutation     PreE w/o SF (G2) 2023     21 F Apg 8/9 Wt 6#9  2023    Ultrasound recheck of fetal pyelectasis, antepartum 2012    Varicose veins of both lower extremities, unspecified whether complicated        Surgical History:     Past Surgical History:   Procedure Laterality Date    CARPAL TUNNEL RELEASE Right        Family History:     Family History   Problem Relation Age of

## 2025-04-19 NOTE — ED NOTES
Pt. Resting in bed, eyes open, NAD. Pt resp even and non labored. Pt updated on poc. She denies any further needs at this time. Will continue with plan of care.

## 2025-04-19 NOTE — ED NOTES
Entered patient room, patient not in room was transported to vascular for testing.   Procalcitonin to be drawn once back.

## 2025-04-19 NOTE — ED PROVIDER NOTES
[facility] EMERGENCY DEPARTMENT  eMERGENCY dEPARTMENT eNCOUnter   Attending Attestation     Pt Name: Linda MCCAIN  MRN: [id number]  Birthdate 1995  Date of evaluation: 4/19/25       Linda MCCAIN is a 29 y.o. female who presents with Back Pain and Chest Pain      12:20 AM EDT      History: Pt presents with back pain and chest pain. Pt is 7 wks pregnant. Pt has hx of DVT. Pt was on AC.     Exam: HRRR, lungs CTABL, abdomen soft and non tender. Pt well appearing.     Plan for labs, D dimer, Acs workup.     D dimer positive. Will plan for CT.     I performed a history and physical examination of the patient and discussed management with the resident. I reviewed the resident’s note and agree with the documented findings and plan of care. Any areas of disagreement are noted on the chart. I was personally present for the key portions of any procedures. I have documented in the chart those procedures where I was not present during the key portions. I have personally reviewed all images and agree with the resident's interpretation. I have reviewed the emergency nurses triage note. I agree with the chief complaint, past medical history, past surgical history, allergies, medications, social and family history as documented unless otherwise noted below. Documentation of the HPI, Physical Exam and Medical Decision Making performed by medical students or scribes is based on my personal performance of the HPI, PE and MDM. For Phys Assistant/ Nurse Practitioner cases/documentation I have had a face to face evaluation of this patient and have completed at least one if not all key elements of the E/M (history, physical exam, and MDM). Additional findings are as noted.    For APC cases I have personally evaluated and examined the patient in conjunction with the APC and agree with the treatment plan and disposition of the patient as recorded by the APC.    Shlomo Balderas MD  Attending Emergency  Physician       Shlomo Balderas

## 2025-04-19 NOTE — CONSULTS
OB/GYN Consult  [facility]    Patient Name: Linda MCCAIN     Patient : 1995  Room/Bed:   Admission Date/Time: 2025 10:49 PM  Primary Care Physician: Ale Wilks DO    Consulting Provider: Dr. Parr  Reason for Consult: 8 wk pregnant, PE    CC:   Chief Complaint   Patient presents with    Back Pain    Chest Pain                HPI: Linda MCCAIN is a 29 y.o. female  presents to the ED with back pain and chest pain and was diagnosed with a left pulmonary embolism. Patient has a history of an unprovoked DVT in her left leg in  and was treated with Eliquis as well as an MTHFR mutation and a family history of VTE in her father.    OBGYN consulted as patient is approximately 8 weeks pregnancy by LMP on 25. Patient states that this was not a planned pregnancy, however they were not avoiding pregnancy and are accepting.     Patient's last menstrual period was 2025 (exact date).     REVIEW OF SYSTEMS:   A minimum of an eleven point review of systems was completed.    Constitutional: negative fever, negative chills  HEENT: negative visual disturbances, negative headaches  Respiratory: negative dyspnea, negative cough  Cardiovascular: negative chest pain,  negative palpitations  Gastrointestinal: negative abdominal pain, negative RUQ pain, negative N/V, negative diarrhea, negative constipation  Genitourinary: negative dysuria, negative vaginal discharge, negative vaginal bleeding  Dermatological: negative rash, negative wounds  Hematologic: negative bleeding/clotting disorder  Immunologic: negative recent illness, negative recent sick contact, negative allergic reactions  Lymphatic: negative lymph nodes  Musculoskeletal: negative back pain, negative myalgias, negative arthralgias  Neurological:  negative dizziness, negative weakness  Behavior/Psych: negative depression, negative anxiety  g Oral Daily PRN Radha Lee APRN - CNP        acetaminophen (TYLENOL) tablet 650 mg  650 mg Oral Q6H PRN Radha Lee APRN - CNP        Or    acetaminophen (TYLENOL) suppository 650 mg  650 mg Rectal Q6H PRN Radha Lee APRN - CNP        0.9 % sodium chloride infusion   IntraVENous Continuous Nurys Parr  mL/hr at 04/19/25 0634 Rate Change at 04/19/25 0634    sodium chloride 0.9 % bolus 1,000 mL  1,000 mL IntraVENous Once Nurys Parr .6 mL/hr at 04/19/25 0640 1,000 mL at 04/19/25 0640    aspirin chewable tablet 81 mg  81 mg Oral Daily Sandy Perales DO        prenatal vitamin plus iron 29-1 MG tablet 1 tablet  1 each Oral Daily Sandy Perales DO         No current outpatient medications on file.       FAMILY HISTORY:  Family History of Breast, Ovarian, Colon or Uterine Cancer: No   family history includes Asthma in her sister; Bipolar Disorder in her maternal cousin; Congenital Heart Defect in her maternal cousin; Deep Vein Thrombosis in her father; Diabetes in her maternal grandmother; Hypertension in her maternal grandmother; Pulmonary Embolism in her father; Stroke in her father.    SOCIAL HISTORY:   reports that she has been smoking e-cigarettes. She has never used smokeless tobacco. She reports that she does not drink alcohol and does not use drugs.  ________________________________________________________________________                                    VITALS:  Vitals:    04/19/25 0430 04/19/25 0445 04/19/25 0615 04/19/25 0630   BP: 96/60 (!) 95/59 109/73 95/60   Pulse: 88 91 95 89   Resp:       Temp:       TempSrc:       SpO2: 92% 97% 98% 97%   Weight:                                                      INPUT/OUTPUT:  No intake/output data recorded.  No intake/output data recorded.                                                                                                                               PHYSICAL EXAM:     General

## 2025-04-19 NOTE — DISCHARGE SUMMARY
[facility]  Office: [phone]  Tre Ray DO, Brandon Sacnhez DO, Franc Madison DO, Jeffery Freeman DO, Syd Harris MD, Cornelia Pearson MD, Georgette Sunshine MD, Rosy Doyle MD,  Juan Grider MD, Alexia Mclaughlin MD, Violette Seo MD,  Grover Grover DO, Anna Ventura MD, Sulaiman Saunders MD, Jose Ray DO, Nurys Parr MD,  Eliezer Rangel DO, Isabelle Ferrari MD, Noa Cheung MD, Erika Alcaraz MD,  Luis F Mina MD, Jose Merlos MD, Jean Alaniz MD, Malcom Dubose MD, Kenney Velasquez MD, Cordelia Mckeon MD, Andrew Alegria DO, Vicki Briscoe MD, Willis Valladares MD, Grover Luciano MD, Mohsin Reza, MD, Kaiden Calderon MD, Shirley Waterhouse, CNP,  Sharron Denton, CNP, Andrew Fabian, CNP,  Comfort Andrade, DNP, Juany Silva, CNP, Leda Whitten, CNP, Sandy Baker, CNP, Kassidy Cook, CNP, Tonia Beck, PA-C, Aziza Blank, CNP, Marilia You, CNP,  Myesha Mandujano, CNP, Lissett Mohr, CNP, Sy Arango, PA-C, Taniya Coppola, CNP,  Elissa Rich, CNS, Jannie Hernandez, CNP, Radha Lee, CNP,   Marnie Burroughs, CNP         [facility]   IN-PATIENT SERVICE   [facility]    Discharge Summary     Patient ID: Linda MCCAIN  :  1995   MRN: [id number]     ACCOUNT:  [id number]   Patient's PCP: Ale Wilks DO  Admit Date: 2025   Discharge Date: 2025  Length of Stay: 1  Code Status:  Full Code  Admitting Physician: Sulaiman Saunders MD  Discharge Physician: Sulaiman Saunders MD     Active Discharge Diagnoses:     Hospital Problem Lists:  Principal Problem:    Pulmonary embolism affecting pregnancy in first trimester  Active Problems:    MTHFR mutation    Hx of DVT of leg 2020    Hypercoagulable state  Resolved Problems:    * No resolved hospital problems. *      Admission Condition:  good     Discharged Condition: good    Hospital Stay:     Hospital Course:  Linda MCCAIN is a 29 y.o. pregnant female with a past medical history of previous DVT       Phone: [phone]   acetaminophen 500 MG tablet  azithromycin 500 MG tablet  enoxaparin 80 MG/0.8ML         Time spent on discharge is 20 mins in patient examination, evaluation, counseling as well as medication reconciliation, prescriptions for required medications, discharge plan and follow up.    Electronically signed by   Sulaiman Saunders MD  4/19/2025  9:07 AM      Thank you Dr. Wilks Ale  for the opportunity to be involved in this patient's care.

## 2025-04-19 NOTE — H&P
[facility]  Office: [phone]  Tre Ray, DO, Brandon Sanchez, DO, Franc Madison DO, Jeffery Freeman, DO, Syd Harris MD, Cornelia Pearson MD, Georgette Sunshine MD, Rosy Doyle MD,  Juan Grider MD, Alexia Mclaughlin MD, Violette Seo MD,  Grover Grover DO, Anna Ventura MD, Sulaiman Saunders MD, Jose Ray DO, Nurys Parr MD,  Eliezer Rangel DO, Isabelle Ferrari MD, Noa Cheung MD, Erika Alcaraz MD,  Luis F Mina MD, Jose Merlos MD, Jean Alaniz MD, Malcom Dubose MD, Kenney Velasquez MD, Cordelia Mckeon MD, Andrew Alegria, DO, Vicki Briscoe MD, Willis Valladares MD, Grover Luciano MD, Mohsin Reza, MD, Kaiden Calderon MD, Shirley Waterhouse, CNP,  Sharron Denton, CNP, Andrew Fabian, CNP,  Comfort Andrade, DNP, Juany Silva, CNP, Leda Whitten, CNP, Sandy Baker, CNP, Kassidy Cook, CNP, Tonia Beck, PA-C, Aziza Blank, CNP, Marilia You, CNP,  Myesha Mandujano, CNP, Lissett Mohr, CNP, Sy Arango, PA-C, Taniya Coppola, CNP,  Elissa Rich, CNS, Jannie eHrnandez, CNP, Radha Lee, CNP,   Marnie Burroughs, CNP         [facility]   IN-PATIENT SERVICE   [facility]     HISTORY AND PHYSICAL EXAMINATION            Date:   4/19/2025  Patient name:  Linda MCCAIN  Date of admission:  4/18/2025 10:49 PM  MRN:   [id number]  Account:  [id number]  YOB: 1995  PCP:    Ale Wilks DO  Room:   17/17  Code Status:    Full Code      History Obtained From:     patient    History of Present Illness:     Linda MCCAIN is a 29 y.o. pregnant female with a past medical history of previous DVT and MTHFR mutation who presented to the emergency department on 4/18/2025 complaining of chest and back pain. The patient's symptoms began three-days-ago and have progressively worsened. In the ED, the patient was afebrile and nontoxic appearing. She was not hypoxic or tachycardic. CTPA was remarkable for segmental level pulmonary emboli in the posterior basal left

## 2025-04-19 NOTE — ED NOTES
ED to inpatient nurses report      Chief Complaint:  Chief Complaint   Patient presents with    Back Pain    Chest Pain     Present to ED from: Pt. Presents to the ED from home for chest pain and back pain. Pt states that for the last 3 days she has had this pain, but it has gotten progressively worse. Pt states that today it was the worst, so she wanted to be seen. Pt recently found out she was pregnant with a LMP of 2/22. Pt has an ob appointment scheduled next week to begin care. Pt complains of pain on the left side of her chest and the middle of her back. Pt expresses that her pain gets worse when taking a deep breath. Pt has a hx of dvt's in the past and was placed on blood thinners for a year before they were d/c'd. Pt placed on continuous cardiac monitor, bp and pulse ox. EKG completed, IV established, blood work drawn. Resp even and non labored. Will continue with plan of care.     MOA:     LOC: alert and orientated to name, place, date  Mobility: Independent  Oxygen Baseline: ra    Current needs required: 0   Pending ED orders: none  Present condition: stable    Why did the patient come to the ED? Chest and back pain  What is the plan? Admit for lovenox BID. To see specialists related to PE. OB to be consulted as well  Any procedures or intervention occur? CTPE, cxr, labs  Any safety concerns??    Mental Status:       Psych Assessment:   Psychosocial  Psychosocial (WDL): Within Defined Limits  Vital signs   Vitals:    04/19/25 0330 04/19/25 0400 04/19/25 0415 04/19/25 0430   BP: 113/85 118/77 108/79 96/60   Pulse: 88 96 84 88   Resp:       Temp:       TempSrc:       SpO2: 99% 100% 99% 92%   Weight:            Vitals:  Patient Vitals for the past 24 hrs:   BP Temp Temp src Pulse Resp SpO2 Weight   04/19/25 0430 96/60 -- -- 88 -- 92 % --   04/19/25 0415 108/79 -- -- 84 -- 99 % --   04/19/25 0400 118/77 -- -- 96 -- 100 % --   04/19/25 0330 113/85 -- -- 88 -- 99 % --   04/19/25 0251 122/77 -- -- 93 -- 99 % --

## 2025-04-19 NOTE — ED NOTES
Pt. Presents to the ED from home for chest pain and back pain. Pt states that for the last 3 days she has had this pain, but it has gotten progressively worse. Pt states that today it was the worst, so she wanted to be seen. Pt recently found out she was pregnant with a LMP of 2/22. Pt has an ob appointment scheduled next week to begin care. Pt complains of pain on the left side of her chest and the middle of her back. Pt expresses that her pain gets worse when taking a deep breath. Pt has a hx of dvt's in the past and was placed on blood thinners for a year before they were d/c'd. Pt placed on continuous cardiac monitor, bp and pulse ox. EKG completed, IV established, blood work drawn. Resp even and non labored. Will continue with plan of care.

## 2025-04-19 NOTE — ED PROVIDER NOTES
[facility] EMERGENCY DEPARTMENT  Emergency Department Encounter  Emergency Medicine Resident     Pt Name:Linda MCCAIN  MRN: [id number]  Birthdate 1995  Date of evaluation: 25  PCP:  Ale Wilks DO  Note Started: 10:53 PM EDT      CHIEF COMPLAINT       Chief Complaint   Patient presents with    Back Pain    Chest Pain       HISTORY OF PRESENT ILLNESS  (Location/Symptom, Timing/Onset, Context/Setting, Quality, Duration, Modifying Factors, Severity.)      Linda MCCAIN is a 29 y.o. female who presents with Mid bilateral back pain that radiates throughout her back, into her left shoulder, and the front of her chest.  Patient is 7 weeks and 6 days pregnant. She states that this has been ongoing for approximately 3 days.  She denies any cough, fever, chills, lightheadedness.  She does state that she feels the pain worse when she takes a deep inspiration.  Due to this, she feels like she is restricting her breathing.  She states that this shortness of breath is also worsened when she is up and walking around.  She does have a history of an unprovoked DVT prior to her first pregnancy and states that she was put on anticoagulation for a year.  After her first pregnancy she was taking Lovenox shots for several weeks after delivery.  Since then she has not been on anticoagulation.  She denies any abdominal pain, lightheadedness, recent surgery, recent travel, unilateral leg swelling or erythema.  She states that she has some nausea but that it is only after eating and has been like this for her full 7 weeks of pregnancy.    PAST MEDICAL / SURGICAL / SOCIAL / FAMILY HISTORY      has a past medical history of Gestational hypertension (G2), History of DVT of lower extremity, MTHFR mutation, PreE w/o SF (G2),  23 F Apg 8/9 Wt 6#9 , Ultrasound recheck of fetal pyelectasis, antepartum, and Varicose veins of both lower extremities, unspecified whether complicated.       has a past surgical history

## 2025-04-29 ENCOUNTER — TELEPHONE (OUTPATIENT)
Dept: OBGYN CLINIC | Age: 30
End: 2025-04-29

## 2025-04-29 NOTE — TELEPHONE ENCOUNTER
Per [facility] C[facility], pt does not need US due to having one completed at the hospital on 4/19/25.  Pt will come at 2pm for intake only.  Pt verbalized understanding.

## 2025-04-30 ENCOUNTER — INITIAL PRENATAL (OUTPATIENT)
Dept: OBGYN CLINIC | Age: 30
End: 2025-04-30
Payer: COMMERCIAL

## 2025-04-30 VITALS
WEIGHT: 185 LBS | DIASTOLIC BLOOD PRESSURE: 84 MMHG | BODY MASS INDEX: 29.86 KG/M2 | SYSTOLIC BLOOD PRESSURE: 124 MMHG | HEART RATE: 80 BPM

## 2025-04-30 DIAGNOSIS — Z34.90 EARLY STAGE OF PREGNANCY: ICD-10-CM

## 2025-04-30 DIAGNOSIS — Z86.718 HX OF DEEP VENOUS THROMBOSIS: ICD-10-CM

## 2025-04-30 DIAGNOSIS — Z86.711 HISTORY OF PULMONARY EMBOLISM: Primary | ICD-10-CM

## 2025-04-30 DIAGNOSIS — O14.92 PRE-ECLAMPSIA IN SECOND TRIMESTER: ICD-10-CM

## 2025-04-30 DIAGNOSIS — Z3A.09 9 WEEKS GESTATION OF PREGNANCY: ICD-10-CM

## 2025-04-30 DIAGNOSIS — Z15.89 MTHFR MUTATION: Primary | Chronic | ICD-10-CM

## 2025-04-30 DIAGNOSIS — Z15.89 MTHFR GENE MUTATION: ICD-10-CM

## 2025-04-30 DIAGNOSIS — O13.1 GESTATIONAL HYPERTENSION, FIRST TRIMESTER: ICD-10-CM

## 2025-04-30 DIAGNOSIS — O14.90 PRE-ECLAMPSIA, ANTEPARTUM: ICD-10-CM

## 2025-04-30 DIAGNOSIS — Z82.79 FAMILY HISTORY OF CONGENITAL HEART DEFECT: ICD-10-CM

## 2025-04-30 DIAGNOSIS — O88.211 PULMONARY EMBOLISM AFFECTING PREGNANCY IN FIRST TRIMESTER: ICD-10-CM

## 2025-04-30 PROBLEM — G56.01 CARPAL TUNNEL SYNDROME ON RIGHT: Status: RESOLVED | Noted: 2018-08-28 | Resolved: 2025-04-30

## 2025-04-30 PROCEDURE — H1000 PRENATAL CARE ATRISK ASSESSM: HCPCS | Performed by: NURSE PRACTITIONER

## 2025-04-30 RX ORDER — PYRIDOXINE HCL (VITAMIN B6) 50 MG
1 TABLET ORAL 2 TIMES DAILY
Qty: 60 TABLET | Refills: 4 | Status: SHIPPED | OUTPATIENT
Start: 2025-04-30

## 2025-04-30 RX ORDER — FOLIC ACID 1 MG/1
1 TABLET ORAL 2 TIMES DAILY
Qty: 60 TABLET | Refills: 4 | Status: SHIPPED | OUTPATIENT
Start: 2025-04-30

## 2025-04-30 NOTE — PROGRESS NOTES
Patient presents to office for OB intake, nurse visit only. Intake completed following ultrasound in office to confirm pregnancy dating/viability. Based on ultrasound, patient is currently 9w2d  gestation, Estimated Date of Delivery: 12/1/25. Patient presents to intake FOB. This was a planned pregnancy. Patient currently taking has a current medication list which includes the following prescription(s): prenatal vit w/[id number], enoxaparin, and acetaminophen.. Patient's medical, surgical, obstetrical and family history reviewed. See EHR for details. Patient prenatal lab work given to patient at this visit as well as OB education material. New OB consent forms signed. Patient accepted first trimester screening. Cystic Fibrosis screening previously done. Referral placed to [facility] for first trimester screen, hx of PE/DVT, gHTN, preE, MTHFR, family hx of CHD.  Referral sent to hematology for PE.  Patient scheduled for follow up OB appointment with Comfort COOK on 5/21/25. Patient instructed to complete lab work prior to follow up OB appointment.

## 2025-05-13 ENCOUNTER — PATIENT MESSAGE (OUTPATIENT)
Dept: OBGYN CLINIC | Age: 30
End: 2025-05-13

## 2025-05-13 DIAGNOSIS — O88.211 PULMONARY EMBOLISM AFFECTING PREGNANCY IN FIRST TRIMESTER: Primary | ICD-10-CM

## 2025-05-13 DIAGNOSIS — Z15.89 MTHFR MUTATION: Chronic | ICD-10-CM

## 2025-05-13 DIAGNOSIS — Z86.718 HX OF DEEP VENOUS THROMBOSIS: ICD-10-CM

## 2025-05-13 DIAGNOSIS — D68.59 HYPERCOAGULABLE STATE: ICD-10-CM

## 2025-05-13 NOTE — TELEPHONE ENCOUNTER
Pt requesting new hematology referral for [facility] as she has seen them in the past. Referral placed in [facility].

## 2025-05-16 ENCOUNTER — TELEPHONE (OUTPATIENT)
Age: 30
End: 2025-05-16

## 2025-05-19 ENCOUNTER — ROUTINE PRENATAL (OUTPATIENT)
Age: 30
End: 2025-05-19
Payer: COMMERCIAL

## 2025-05-19 VITALS
HEIGHT: 66 IN | BODY MASS INDEX: 29.57 KG/M2 | HEART RATE: 88 BPM | SYSTOLIC BLOOD PRESSURE: 120 MMHG | DIASTOLIC BLOOD PRESSURE: 82 MMHG | WEIGHT: 184 LBS | TEMPERATURE: 98.4 F | RESPIRATION RATE: 16 BRPM

## 2025-05-19 DIAGNOSIS — Z86.718 HX OF DEEP VENOUS THROMBOSIS: ICD-10-CM

## 2025-05-19 DIAGNOSIS — Z3A.12 12 WEEKS GESTATION OF PREGNANCY: ICD-10-CM

## 2025-05-19 DIAGNOSIS — O88.211 PULMONARY EMBOLISM AFFECTING PREGNANCY IN FIRST TRIMESTER: ICD-10-CM

## 2025-05-19 DIAGNOSIS — Z34.90 THIRD PREGNANCY: Primary | ICD-10-CM

## 2025-05-19 DIAGNOSIS — Z15.89 MTHFR MUTATION: Chronic | ICD-10-CM

## 2025-05-19 PROCEDURE — 76801 OB US < 14 WKS SINGLE FETUS: CPT | Performed by: OBSTETRICS & GYNECOLOGY

## 2025-05-19 PROCEDURE — 76813 OB US NUCHAL MEAS 1 GEST: CPT | Performed by: OBSTETRICS & GYNECOLOGY

## 2025-05-19 PROCEDURE — 99999 PR OFFICE/OUTPT VISIT,PROCEDURE ONLY: CPT | Performed by: OBSTETRICS & GYNECOLOGY

## 2025-05-19 NOTE — PROGRESS NOTES
[facility] MATERNAL FETAL MED  [address]  [place] [address]  Dept: [phone]     2025    RE:  Linda Sage     : 1995   AGE: 29 y.o.     Dear Dr. Green,    Thank you for allowing me to see Linda Sage.  As I'm sure you will recall, Linda Sage is a 29 y.o. [id number]'s last menstrual period was 2025 (exact date). Estimated Date of Delivery: 25 at 12w2d seen in our office today for the following:    REASON FOR VISIT: Nuchal Translucency    Patient Active Problem List    Diagnosis Date Noted    MTHFR mutation 10/18/2022    Hx of DVT of leg 2020 10/18/2022    Third pregnancy 2025    Pulmonary embolism affecting pregnancy in first trimester 2025    Rh+/RI/GBSpos 2023     23 F Apg 8/9 Wt 6#9  2023    FHx of DVT 2022    Hypercoagulable state 2021        PAST HISTORY:  OB History    Para Term  AB Living   3 2 2 0 0 2   SAB IAB Ectopic Molar Multiple Live Births   0 0 0  0 2      # Outcome Date GA Lbr Tej/2nd Weight Sex Type Anes PTL Lv   3 Current            2 Term 23 37w1d 00:55 / 00:01 2.98 kg (6 lb 9.1 oz) F Vag-Spont None N MARLENY   1 Term 10/27/12 40w0d  3.742 kg (8 lb 4 oz) M Vag-Spont   MARLENY          MEDICAL:  Past Medical History:   Diagnosis Date    Carpal tunnel syndrome on right 2018    Gestational hypertension (G2) 2023    History of DVT of lower extremity     MTHFR mutation     PreE w/o SF (G2) 2023    Pulmonary embolism (HCC) 2025     23 F Apg 8/9 Wt 6#9  2023    Ultrasound recheck of fetal pyelectasis, antepartum 2012    Varicose veins of both lower extremities, unspecified whether complicated         SURGICAL:  Past Surgical History:   Procedure Laterality Date    CARPAL TUNNEL RELEASE Right        ALLERGIES:    No Known Allergies      MEDICATIONS:    Current Outpatient Medications   Medication

## 2025-05-21 ENCOUNTER — ROUTINE PRENATAL (OUTPATIENT)
Dept: OBGYN CLINIC | Age: 30
End: 2025-05-21
Payer: COMMERCIAL

## 2025-05-21 ENCOUNTER — HOSPITAL ENCOUNTER (OUTPATIENT)
Age: 30
Setting detail: SPECIMEN
Discharge: HOME | End: 2025-05-21

## 2025-05-21 ENCOUNTER — CLINICAL SUPPORT (OUTPATIENT)
Dept: OBGYN CLINIC | Age: 30
End: 2025-05-21
Payer: COMMERCIAL

## 2025-05-21 VITALS
WEIGHT: 184 LBS | DIASTOLIC BLOOD PRESSURE: 72 MMHG | BODY MASS INDEX: 29.7 KG/M2 | HEART RATE: 86 BPM | SYSTOLIC BLOOD PRESSURE: 114 MMHG

## 2025-05-21 DIAGNOSIS — Z3A.12 12 WEEKS GESTATION OF PREGNANCY: Primary | ICD-10-CM

## 2025-05-21 DIAGNOSIS — Z81.3 FAMILY HISTORY OF DRUG ABUSE: ICD-10-CM

## 2025-05-21 DIAGNOSIS — Z3A.12 12 WEEKS GESTATION OF PREGNANCY: ICD-10-CM

## 2025-05-21 DIAGNOSIS — Z3A.09 9 WEEKS GESTATION OF PREGNANCY: ICD-10-CM

## 2025-05-21 DIAGNOSIS — Z22.330 GBS CARRIER: ICD-10-CM

## 2025-05-21 DIAGNOSIS — Z15.89 MTHFR MUTATION: ICD-10-CM

## 2025-05-21 DIAGNOSIS — O88.219 PULMONARY EMBOLISM AFFECTING PREGNANCY, ANTEPARTUM: ICD-10-CM

## 2025-05-21 DIAGNOSIS — Z34.90 EARLY STAGE OF PREGNANCY: ICD-10-CM

## 2025-05-21 LAB
BASOPHILS # BLD: 0.03 K/UL (ref 0–0.2)
BASOPHILS NFR BLD: 0 % (ref 0–2)
EOSINOPHIL # BLD: 0.38 K/UL (ref 0–0.44)
EOSINOPHILS RELATIVE PERCENT: 4 % (ref 1–4)
ERYTHROCYTE [DISTWIDTH] IN BLOOD BY AUTOMATED COUNT: 12 % (ref 11.8–14.4)
HBV SURFACE AG SERPL QL IA: NONREACTIVE
HCT VFR BLD AUTO: 39.4 % (ref 36.3–47.1)
HCV AB SERPL QL IA: NONREACTIVE
HGB BLD-MCNC: 13 G/DL (ref 11.9–15.1)
HIV 1+2 AB+HIV1 P24 AG SERPL QL IA: NONREACTIVE
IMM GRANULOCYTES # BLD AUTO: <0.03 K/UL (ref 0–0.3)
IMM GRANULOCYTES NFR BLD: 0 %
LYMPHOCYTES NFR BLD: 1.56 K/UL (ref 1.1–3.7)
LYMPHOCYTES RELATIVE PERCENT: 18 % (ref 24–43)
MCH RBC QN AUTO: 30.9 PG (ref 25.2–33.5)
MCHC RBC AUTO-ENTMCNC: 33 G/DL (ref 28.4–34.8)
MCV RBC AUTO: 93.6 FL (ref 82.6–102.9)
MONOCYTES NFR BLD: 0.55 K/UL (ref 0.1–1.2)
MONOCYTES NFR BLD: 6 % (ref 3–12)
NEUTROPHILS NFR BLD: 72 % (ref 36–65)
NEUTS SEG NFR BLD: 6.17 K/UL (ref 1.5–8.1)
NRBC BLD-RTO: 0 PER 100 WBC
PLATELET # BLD AUTO: 199 K/UL (ref 138–453)
PMV BLD AUTO: 11.8 FL (ref 8.1–13.5)
RBC # BLD AUTO: 4.21 M/UL (ref 3.95–5.11)
RUBV IGG SERPL QL IA: 81.4 IU/ML
TSH SERPL DL<=0.05 MIU/L-ACNC: 0.41 UIU/ML (ref 0.27–4.2)
WBC OTHER # BLD: 8.7 K/UL (ref 3.5–11.3)

## 2025-05-21 PROCEDURE — NBSRV NON-BILLABLE SERVICE: Performed by: NURSE PRACTITIONER

## 2025-05-21 PROCEDURE — 36415 COLL VENOUS BLD VENIPUNCTURE: CPT | Performed by: NURSE PRACTITIONER

## 2025-05-21 PROCEDURE — 99214 OFFICE O/P EST MOD 30 MIN: CPT | Performed by: NURSE PRACTITIONER

## 2025-05-21 NOTE — PROGRESS NOTES
Patient was in the office today for a PRENATAL LABS .    Draw per physician order using sterile technique.  Drawn from the RIGHT AC.

## 2025-05-21 NOTE — PROGRESS NOTES
Margie Sage  2025    YOB: 1995   Patient's last menstrual period was 2025 (exact date).  12w4d        Primary Care Physician: None, None        CC: Initial Prenatal Visit    Subjective:     Margie Sage is a 29 y.o. female     is being seen today for her first obstetrical visit.  This is a planned pregnancy. She is at 12w4d  Her obstetrical history is significant for pulmonary embolism, hx preeclampsia, hx DVT 2020, GBS.      Relationship with FOB: significant other, living together. Patient does intend to breast feed. Pregnancy history fully reviewed.  Mother's ethnicity:   Father's ethnicity:       Objective:   Blood pressure 114/72, pulse 86, weight 83.5 kg (184 lb), last menstrual period 2025, currently breastfeeding.    OB History    Para Term  AB Living   3 2 2 0 0 2   SAB IAB Ectopic Molar Multiple Live Births   0 0 0 0 0 2      # Outcome Date GA Lbr Tej/2nd Weight Sex Type Anes PTL Lv   3 Current            2 Term 23 37w1d 00:55 / 00:01 2.98 kg (6 lb 9.1 oz) F Vag-Spont None N MARLENY      Name: KALYN,BABY GIRL MARGIE      Apgar1: 8  Apgar5: 9   1 Term 10/27/12 40w0d  3.742 kg (8 lb 4 oz) M Vag-Spont   MARLENY       Past Medical History:   Diagnosis Date    Carpal tunnel syndrome on right 2018    Gestational hypertension (G2) 2023    History of DVT of lower extremity     MTHFR mutation     PreE w/o SF (G2) 2023    Pulmonary embolism (HCC) 2025     23 F Apg 8/9 Wt 6#9  2023    Ultrasound recheck of fetal pyelectasis, antepartum 2012    Varicose veins of both lower extremities, unspecified whether complicated      Past Surgical History:   Procedure Laterality Date    CARPAL TUNNEL RELEASE Right       Social History     Socioeconomic History    Marital status: Single     Spouse name: Not on file    Number of children: Not on file    Years of education: Not on file    Highest

## 2025-05-22 ENCOUNTER — RESULTS FOLLOW-UP (OUTPATIENT)
Dept: OBGYN CLINIC | Age: 30
End: 2025-05-22

## 2025-05-22 LAB
ABO + RH BLD: NORMAL
AMPHET UR QL SCN: NEGATIVE
BARBITURATES UR QL SCN: NEGATIVE
BENZODIAZ UR QL: NEGATIVE
BILIRUB UR QL STRIP: NEGATIVE
BLOOD GROUP ANTIBODIES SERPL: NEGATIVE
C TRACH DNA SPEC QL PROBE+SIG AMP: NEGATIVE
CANNABINOIDS UR QL SCN: NEGATIVE
CLARITY UR: CLEAR
COCAINE UR QL SCN: NEGATIVE
COLOR UR: YELLOW
COMMENT: ABNORMAL
FENTANYL UR QL: NEGATIVE
GLUCOSE SERPL-MCNC: 64 MG/DL (ref 74–99)
GLUCOSE UR STRIP-MCNC: NEGATIVE MG/DL
HGB UR QL STRIP.AUTO: NEGATIVE
KETONES UR STRIP-MCNC: ABNORMAL MG/DL
LEUKOCYTE ESTERASE UR QL STRIP: NEGATIVE
METHADONE UR QL: NEGATIVE
N GONORRHOEA DNA SPEC QL PROBE+SIG AMP: NEGATIVE
NITRITE UR QL STRIP: NEGATIVE
OPIATES UR QL SCN: NEGATIVE
OXYCODONE UR QL SCN: NEGATIVE
PCP UR QL SCN: NEGATIVE
PH UR STRIP: 6.5 [PH] (ref 5–8)
PROT UR STRIP-MCNC: NEGATIVE MG/DL
SP GR UR STRIP: 1.02 (ref 1–1.03)
SPECIMEN DESCRIPTION: NORMAL
T PALLIDUM AB SER QL IA: NONREACTIVE
TEST INFORMATION: NORMAL
UROBILINOGEN UR STRIP-ACNC: NORMAL EU/DL (ref 0–1)

## 2025-05-23 PROBLEM — O99.820 GBS (GROUP B STREPTOCOCCUS CARRIER), +RV CULTURE, CURRENTLY PREGNANT: Status: ACTIVE | Noted: 2025-05-23

## 2025-05-23 NOTE — TELEPHONE ENCOUNTER
----- Message from JUSTIN Jha - CNP sent at 5/23/2025 11:02 AM EDT -----  +GBS- treat in labor per protocol  + candida in early pregnancy. OTC monistat if patient having symptoms

## 2025-05-23 NOTE — TELEPHONE ENCOUNTER
[facility] Result Note Patient Contact Communication   [address] [place] [address]      05/23/25   11:46 AM     Patients Name: Linda Sage   Medical Record Number: [id number]   Contact Serial Number: [id number]  YOB: 1995       Patients Phone Number: [phone]     Description of Recommendations:  The patient was contacted and her identity was confirmed with two of the specific identifiers listed above.  The information regarding her recent testing results and provider recommendations were reviewed with her in detail and all questions were answered.   Recent labs/Cultures/ Imaging Studies/Pathology reports and Urinalysis results are included:      Recommendations given by provider:  +GBS- treat in labor per protocol  + candida in early pregnancy. OTC monistat if patient having symptoms       The patient verbalized understanding of the information above and the recommendation by the provider. She will contact her PCP if any other questions arise or contact our office for any other clarifications.        Electronically signed by Alphonso Jiang MA on 5/23/2025 at 11:46 AM

## 2025-05-24 LAB
MICROORGANISM SPEC CULT: ABNORMAL
SERVICE CMNT-IMP: ABNORMAL
SPECIMEN DESCRIPTION: ABNORMAL

## 2025-06-02 ENCOUNTER — RESULTS FOLLOW-UP (OUTPATIENT)
Dept: OBGYN CLINIC | Age: 30
End: 2025-06-02

## 2025-06-03 ENCOUNTER — TELEPHONE (OUTPATIENT)
Age: 30
End: 2025-06-03

## 2025-06-03 ENCOUNTER — INITIAL CONSULT (OUTPATIENT)
Age: 30
End: 2025-06-03
Payer: COMMERCIAL

## 2025-06-03 ENCOUNTER — HOSPITAL ENCOUNTER (OUTPATIENT)
Age: 30
Setting detail: SPECIMEN
Discharge: HOME | End: 2025-06-03
Payer: COMMERCIAL

## 2025-06-03 VITALS
TEMPERATURE: 98.2 F | HEART RATE: 92 BPM | RESPIRATION RATE: 17 BRPM | OXYGEN SATURATION: 96 % | DIASTOLIC BLOOD PRESSURE: 83 MMHG | BODY MASS INDEX: 29.73 KG/M2 | SYSTOLIC BLOOD PRESSURE: 120 MMHG | WEIGHT: 184.2 LBS

## 2025-06-03 DIAGNOSIS — O88.219 PULMONARY EMBOLISM AFFECTING PREGNANCY, ANTEPARTUM: ICD-10-CM

## 2025-06-03 DIAGNOSIS — Z82.49 FAMILY HISTORY OF DVT: ICD-10-CM

## 2025-06-03 DIAGNOSIS — Z15.89 MTHFR MUTATION: Chronic | ICD-10-CM

## 2025-06-03 DIAGNOSIS — O88.219 PULMONARY EMBOLISM AFFECTING PREGNANCY, ANTEPARTUM: Primary | ICD-10-CM

## 2025-06-03 PROCEDURE — 85613 RUSSELL VIPER VENOM DILUTED: CPT

## 2025-06-03 PROCEDURE — 36415 COLL VENOUS BLD VENIPUNCTURE: CPT

## 2025-06-03 PROCEDURE — 85306 CLOT INHIBIT PROT S FREE: CPT

## 2025-06-03 PROCEDURE — 86147 CARDIOLIPIN ANTIBODY EA IG: CPT

## 2025-06-03 PROCEDURE — 85610 PROTHROMBIN TIME: CPT

## 2025-06-03 PROCEDURE — 85730 THROMBOPLASTIN TIME PARTIAL: CPT

## 2025-06-03 PROCEDURE — 85303 CLOT INHIBIT PROT C ACTIVITY: CPT

## 2025-06-03 PROCEDURE — 86146 BETA-2 GLYCOPROTEIN ANTIBODY: CPT

## 2025-06-03 PROCEDURE — 99202 OFFICE O/P NEW SF 15 MIN: CPT | Performed by: INTERNAL MEDICINE

## 2025-06-03 PROCEDURE — 85240 CLOT FACTOR VIII AHG 1 STAGE: CPT

## 2025-06-03 PROCEDURE — 85300 ANTITHROMBIN III ACTIVITY: CPT

## 2025-06-03 PROCEDURE — 99215 OFFICE O/P EST HI 40 MIN: CPT | Performed by: INTERNAL MEDICINE

## 2025-06-03 NOTE — TELEPHONE ENCOUNTER
AVS 06/03/25    Instructions   from Dr. Ronnell Cuevas MD    Labs today  Rtc in 8 weeks    Labs ordered today  Antithrombin III  Complete this on or around 6/3/2025.  Beta-2 GlycoProtein 1 Ab,IGG & IGM  Complete this on or around 6/3/2025.  Factor VIII Activity  Complete this on or around 6/3/2025.  Lupus Anticoagulant Reflex Panel  Complete this on or around 6/3/2025.  Protein C Activity  Complete this on or around 6/3/2025.  Protein S activity  Complete this on or around 6/3/2025.    RV 07/22/25    Labs to be done today    Pt elected to access avs & schedule on [employer]    Electronically signed by Abimbola Sifuentes on 6/3/2025 at 11:55 AM

## 2025-06-04 LAB
B2 GLYCOPROT1 IGG SERPL IA-ACNC: <0.6 ELISA U/ML (ref 0–7)
B2 GLYCOPROT1 IGM SERPL IA-ACNC: <0.9 ELISA U/ML (ref 0–7)
CARDIOLIPIN IGA SER IA-ACNC: 0.8 APL (ref 0–14)
CARDIOLIPIN IGG SER IA-ACNC: 1.4 GPL (ref 0–10)
CARDIOLIPIN IGM SER IA-ACNC: <0.8 MPL (ref 0–10)
DILUTE RUSSELL VIPER VENOM TIME: ABNORMAL
INR PPP: 1.1
LUPUS ANTICOAG: ABNORMAL
PARTIAL THROMBOPLASTIN TIME: 36 SEC (ref 24–36)
PROTHROMBIN TIME: 14.8 SEC (ref 11.8–14.6)

## 2025-06-05 LAB — AT III ACT/NOR PPP CHRO: 86 % (ref 76–128)

## 2025-06-09 LAB
CARDIOLIPIN IGA SER IA-ACNC: 0.8 APL (ref 0–14)
CARDIOLIPIN IGG SER IA-ACNC: 1.4 GPL (ref 0–10)
CARDIOLIPIN IGM SER IA-ACNC: <0.8 MPL (ref 0–10)
DILUTE RUSSELL VIPER VENOM TIME: ABNORMAL
FACTOR VIII ACTIVITY: 246 % (ref 50–150)
INR PPP: 1.1
PARTIAL THROMBOPLASTIN TIME: 36 SEC (ref 24–36)
PROTEIN C ACTIVITY: 87 %
PROTEIN S ACTIVITY: 62 % (ref 59–130)
PROTHROMBIN TIME: 14.8 SEC (ref 11.8–14.6)

## 2025-06-18 ENCOUNTER — ROUTINE PRENATAL (OUTPATIENT)
Dept: OBGYN CLINIC | Age: 30
End: 2025-06-18
Payer: COMMERCIAL

## 2025-06-18 ENCOUNTER — HOSPITAL ENCOUNTER (OUTPATIENT)
Age: 30
Setting detail: SPECIMEN
Discharge: HOME | End: 2025-06-18

## 2025-06-18 VITALS
BODY MASS INDEX: 29.54 KG/M2 | DIASTOLIC BLOOD PRESSURE: 66 MMHG | WEIGHT: 183 LBS | HEART RATE: 97 BPM | SYSTOLIC BLOOD PRESSURE: 108 MMHG

## 2025-06-18 DIAGNOSIS — Z15.89 MTHFR MUTATION: Chronic | ICD-10-CM

## 2025-06-18 DIAGNOSIS — O09.92 HIGH-RISK PREGNANCY IN SECOND TRIMESTER: Primary | ICD-10-CM

## 2025-06-18 DIAGNOSIS — Z3A.16 16 WEEKS GESTATION OF PREGNANCY: ICD-10-CM

## 2025-06-18 DIAGNOSIS — Z82.49 FAMILY HISTORY OF DVT: ICD-10-CM

## 2025-06-18 DIAGNOSIS — D68.59 HYPERCOAGULABLE STATE: ICD-10-CM

## 2025-06-18 DIAGNOSIS — Z34.90 THIRD PREGNANCY: ICD-10-CM

## 2025-06-18 DIAGNOSIS — Z22.330 GBS CARRIER: ICD-10-CM

## 2025-06-18 DIAGNOSIS — Z81.3 FAMILY HISTORY OF DRUG ABUSE: ICD-10-CM

## 2025-06-18 DIAGNOSIS — Z86.718 HX OF DEEP VENOUS THROMBOSIS: ICD-10-CM

## 2025-06-18 DIAGNOSIS — O99.820 GBS (GROUP B STREPTOCOCCUS CARRIER), +RV CULTURE, CURRENTLY PREGNANT: ICD-10-CM

## 2025-06-18 DIAGNOSIS — O88.219 PULMONARY EMBOLISM AFFECTING PREGNANCY, ANTEPARTUM: ICD-10-CM

## 2025-06-18 PROCEDURE — 99213 OFFICE O/P EST LOW 20 MIN: CPT | Performed by: NURSE PRACTITIONER

## 2025-06-18 PROCEDURE — 36415 COLL VENOUS BLD VENIPUNCTURE: CPT | Performed by: NURSE PRACTITIONER

## 2025-06-18 NOTE — PROGRESS NOTES
Linda Sage is a 29 y.o. female 16w4d        OB History    Para Term  AB Living   3 2 2 0 0 2   SAB IAB Ectopic Molar Multiple Live Births   0 0 0  0 2      # Outcome Date GA Lbr Tej/2nd Weight Sex Type Anes PTL Lv   3 Current            2 Term 23 37w1d 00:55 / 00:01 2.98 kg (6 lb 9.1 oz) F Vag-Spont None N MARLENY   1 Term 10/27/12 40w0d  3.742 kg (8 lb 4 oz) M Vag-Spont   MARLENY             Vitals  BP: 108/66  Weight - Scale: 83 kg (183 lb)  Pulse: 97  Patient Position: Sitting  Albumin: Negative  Glucose: Negative  Fundal Height (cm): 16 cm  Fetal HR: 150  Movement: Present      The patient was seen and evaluated. There was Positive fetal movements. No contractions or leakage of fluid. Signs and symptoms of  labor as well as labor were reviewed.The Nuchal Translucency testing was reviewed and found to be not done. A single marker MSAFP was ordered for a 15-20 week gestational age window. TOP ST OH Reviewed. Dates were reviewed with the patient.  An 18-22 week anatomy ultrasound has been ordered.  The patient will return to the office for her next visit in 4 weeks. See antepartum flow sheet.     The following was discussed:  A. Counseling on the incidents of birth defects in the general population being 2-4% and that ultrasound does not diagnose every form of congenital abnormality and has limitations .   B. The only way to evaluate the chromosomal makeup to near 100% certainty is with invasive testing such as chorionic villous sampling or amniocentesis.   C. The options for testing listed in (B) with detail and all risks/benefits and alternatives will be discussed in the high risk setting.   D. The patient was counseled on the benefits of NIPT testing and UNITY-Complete panel after 9 weeks of gestation. NIPT testing (UNITY-Complete) options were discussed with the patient for recessive conditions and aneuploidies. This involves taking a maternal blood sample and

## 2025-06-18 NOTE — PROGRESS NOTES
Patient was in the office today for a QUAD SCREEN.    Draw per physician order using sterile technique.  Drawn from the RIGHT AC.

## 2025-06-20 LAB
AFP INTERPRETATION: NORMAL
AFP MOM: 1.56
AFP QUAD INTERPRETATION: NORMAL
AFP SPECIMEN: NORMAL
AFP: 52 NG/ML
DATE OF BIRTH: NORMAL
DATING METHOD: NORMAL
DETERMINED BY: NORMAL
DIABETIC: NORMAL
DIMERIC INHIBIN A: 212 PG/ML
DONOR EGG?: NORMAL
DUE DATE: NORMAL
ESTIMATED DUE DATE: NORMAL
FAMILY HISTORY NTD: NORMAL
GESTATIONAL AGE: NORMAL
HX OF HEREDITARY DISORDERS: NO
IN VITRO FERTILIZATION: NORMAL
INHIBIN A MOM: 1.03
INSULIN REQ DIABETES: NO
LAST MENSTRUAL PERIOD: NORMAL
MATERNAL AGE AT EDD: 32.2 YR
MATERNAL WEIGHT: NORMAL
MOM FOR HCG, 2ND TRIMESTER: 3.26
MONOCHORIONIC TWINS: NORMAL
NUMBER OF FETUSES: NORMAL
PATIENT WEIGHT UNITS: NORMAL
PATIENT WEIGHT: NORMAL
PATIENT'S HCG, TRI 2: NORMAL IU/L
PREV TRISOMY PREG: NORMAL
RACE (MATERNAL): NORMAL
RACE: NORMAL
REPEAT SPECIMEN?: NORMAL
SMOKING: NORMAL
SMOKING: YES
UE3 MOM: 1.04
UE3 VALUE: 1.23 NG/ML
VALPROIC/CARBAMAZEP: NORMAL
ZZ NTE CLEAN UP: HISTORY: NO

## 2025-06-27 NOTE — PROGRESS NOTES
reformatted images are provided for review.  MIP  images are provided for review. Automated exposure control, iterative  reconstruction, and/or weight based adjustment of the mA/kV was utilized to  reduce the radiation dose to as low as reasonably achievable.    COMPARISON:  None.    HISTORY:  ORDERING SYSTEM PROVIDED HISTORY: hx of DVT, back pain radiating through  chest, worse with inspiration, 7 wks pregnant (SIGNED CONSENT IN CHART MEDIA)  TECHNOLOGIST PROVIDED HISTORY:  hx of DVT, back pain radiating through chest, worse with inspiration, 7 wks  pregnant (SIGNED CONSENT IN CHART MEDIA)    Reason for Exam: hx of DVT, back pain radiating through chest, worse with  inspiration, 7 wks pregnant    FINDINGS:  Pulmonary Arteries: Appropriate contrast timing.  Segmental level pulmonary  emboli in the posterior basal left lower lobe.  No evidence for right heart  strain.    Mediastinum: No evidence of mediastinal lymphadenopathy.  The heart and  pericardium demonstrate no acute abnormality.  There is no acute abnormality  of the thoracic aorta.    Lungs/pleura: Ground-glass opacities in the left lung base are noted.  Trace  left pleural effusion.    Upper Abdomen: No acute finding.    Soft Tissues/Bones: No acute bone or soft tissue abnormality.  Impression: 1. Segmental level pulmonary emboli in the posterior basal left lower lobe.  No evidence for right heart strain.  2. Ground-glass opacities in the left lung base are noted. This may be  related to pulmonary infarct versus developing infiltrate. Trace left pleural  effusion.  Findings were discussed with VICKIE BRUCE at 2:49 am on 4/19/2025.       ASSESSMENT:       Diagnosis Orders   1. Pulmonary embolism affecting pregnancy, antepartum  Lupus Anticoagulant Reflex Panel    Protein C Activity    Protein S activity    Beta-2 GlycoProtein 1 Ab,IGG & IGM    Antithrombin III    Factor VIII Activity      2. MTHFR mutation        3. FHx of DVT                 PLAN:     I

## 2025-07-10 ENCOUNTER — RESULTS FOLLOW-UP (OUTPATIENT)
Dept: OBGYN CLINIC | Age: 30
End: 2025-07-10

## 2025-07-10 LAB
AFP INTERPRETATION: NORMAL
AFP MOM: 1.56
AFP QUAD INTERPRETATION: NORMAL
AFP SPECIMEN: NORMAL
AFP: 52 NG/ML
DATE OF BIRTH: NORMAL
DATING METHOD: NORMAL
DETERMINED BY: NORMAL
DIABETIC: NEGATIVE
DIMERIC INHIBIN A: 212 PG/ML
DONOR EGG?: NORMAL
DUE DATE: NORMAL
ESTIMATED DUE DATE: NORMAL
FAMILY HISTORY NTD: NEGATIVE
GESTATIONAL AGE: NORMAL
HX OF HEREDITARY DISORDERS: NO
IN VITRO FERTILIZATION: NORMAL
INHIBIN A MOM: 1.03
INSULIN REQ DIABETES: NO
LAST MENSTRUAL PERIOD: NORMAL
MATERNAL AGE AT EDD: 32.2 YR
MATERNAL WEIGHT: 183
MOM FOR HCG, 2ND TRIMESTER: 3.26
MONOCHORIONIC TWINS: NORMAL
NUMBER OF FETUSES: NORMAL
PATIENT WEIGHT UNITS: NORMAL
PATIENT WEIGHT: NORMAL
PATIENT'S HCG, TRI 2: NORMAL IU/L
PREV TRISOMY PREG: NORMAL
RACE (MATERNAL): NORMAL
RACE: NORMAL
REPEAT SPECIMEN?: NORMAL
SMOKING: NORMAL
SMOKING: YES
UE3 MOM: 1.04
UE3 VALUE: 1.23 NG/ML
VALPROIC/CARBAMAZEP: NORMAL
ZZ NTE CLEAN UP: HISTORY: NO

## 2025-07-16 ENCOUNTER — TELEPHONE (OUTPATIENT)
Age: 30
End: 2025-07-16

## 2025-07-16 ENCOUNTER — ROUTINE PRENATAL (OUTPATIENT)
Dept: OBGYN CLINIC | Age: 30
End: 2025-07-16

## 2025-07-16 VITALS
SYSTOLIC BLOOD PRESSURE: 112 MMHG | DIASTOLIC BLOOD PRESSURE: 64 MMHG | HEART RATE: 87 BPM | WEIGHT: 187 LBS | BODY MASS INDEX: 30.18 KG/M2

## 2025-07-16 DIAGNOSIS — O09.92 HIGH-RISK PREGNANCY IN SECOND TRIMESTER: Primary | ICD-10-CM

## 2025-07-16 DIAGNOSIS — D68.59 HYPERCOAGULABLE STATE: ICD-10-CM

## 2025-07-16 DIAGNOSIS — O88.219 PULMONARY EMBOLISM AFFECTING PREGNANCY, ANTEPARTUM: ICD-10-CM

## 2025-07-16 DIAGNOSIS — Z86.718 HX OF DEEP VENOUS THROMBOSIS: ICD-10-CM

## 2025-07-16 DIAGNOSIS — O99.820 GBS (GROUP B STREPTOCOCCUS CARRIER), +RV CULTURE, CURRENTLY PREGNANT: ICD-10-CM

## 2025-07-16 DIAGNOSIS — Z22.330 GBS CARRIER: ICD-10-CM

## 2025-07-16 DIAGNOSIS — Z3A.20 20 WEEKS GESTATION OF PREGNANCY: ICD-10-CM

## 2025-07-16 DIAGNOSIS — Z34.90 THIRD PREGNANCY: ICD-10-CM

## 2025-07-16 DIAGNOSIS — Z15.89 MTHFR MUTATION: ICD-10-CM

## 2025-07-16 NOTE — TELEPHONE ENCOUNTER
**OFFICE SPOKE WITH PT, PT AWARE OF APPT & NOTIFIED OF LABS LEFT TO DO**     Electronically signed by Abimbola Sifuentes on 7/16/2025 at 2:15 PM

## 2025-07-16 NOTE — PROGRESS NOTES
Linda Sage is a 29 y.o. female 20w4d        OB History    Para Term  AB Living   3 2 2 0 0 2   SAB IAB Ectopic Molar Multiple Live Births   0 0 0  0 2      # Outcome Date GA Lbr Tej/2nd Weight Sex Type Anes PTL Lv   3 Current            2 Term 23 37w1d 00:55 / 00:01 2.98 kg F Vag-Spont None N MARLENY   1 Term 10/27/12 40w0d  3.742 kg M Vag-Spont   MARLENY       Vitals  BP: 112/64  Weight - Scale: 84.8 kg (187 lb)  Pulse: 87  Patient Position: Sitting  Albumin: Negative  Glucose: Negative  Fundal Height (cm): 20 cm  Fetal HR: 154  Movement: Present    The patient was seen and evaluated. There was positive fetal movements. No contractions or leakage of fluid. Signs and symptoms of  labor as well as labor were reviewed.The Nuchal Translucency testing was reviewed and found to be pt did not do A QUAD marker  was reviewed and found to be normal. The patients anatomy ultrasound has been completed and reviewed with patient. TOP ST OH Reviewed. A 28 week lab panel was ordered. This includes a (HH, 1 hr GTT, U/A C&S). The patient is to complete this in the next two to four weeks.    The following was discussed:  A. Counseling on the incidents of birth defects in the general population being 2-4% and that ultrasound does not diagnose every form of congenital abnormality and has limitations .   B. The only way to evaluate the chromosomal makeup to near 100% certainty is with invasive testing such as chorionic villous sampling or amniocentesis.   C. The options for testing listed in (B) with detail and all risks/benefits and alternatives will be discussed in the high risk setting.   D. The patient was counseled on the benefits of NIPT testing and UNITY-Complete panel after 9 weeks of gestation. NIPT testing (UNITY-Complete) options were discussed with the patient for recessive conditions and aneuploidies. This involves taking a maternal blood sample and                        using cell-free

## 2025-07-17 ENCOUNTER — HOSPITAL ENCOUNTER (OUTPATIENT)
Age: 30
Discharge: HOME | End: 2025-07-17
Payer: COMMERCIAL

## 2025-07-17 PROCEDURE — 85303 CLOT INHIBIT PROT C ACTIVITY: CPT

## 2025-07-17 PROCEDURE — 85306 CLOT INHIBIT PROT S FREE: CPT

## 2025-07-17 PROCEDURE — 36415 COLL VENOUS BLD VENIPUNCTURE: CPT

## 2025-07-17 PROCEDURE — 85240 CLOT FACTOR VIII AHG 1 STAGE: CPT

## 2025-07-21 ENCOUNTER — ROUTINE PRENATAL (OUTPATIENT)
Age: 30
End: 2025-07-21
Payer: COMMERCIAL

## 2025-07-21 VITALS
HEART RATE: 86 BPM | SYSTOLIC BLOOD PRESSURE: 125 MMHG | DIASTOLIC BLOOD PRESSURE: 80 MMHG | WEIGHT: 188.93 LBS | TEMPERATURE: 98.1 F | BODY MASS INDEX: 30.36 KG/M2 | RESPIRATION RATE: 16 BRPM | HEIGHT: 66 IN

## 2025-07-21 DIAGNOSIS — Z3A.21 21 WEEKS GESTATION OF PREGNANCY: Primary | ICD-10-CM

## 2025-07-21 DIAGNOSIS — O88.219 PULMONARY EMBOLISM AFFECTING PREGNANCY, ANTEPARTUM: ICD-10-CM

## 2025-07-21 DIAGNOSIS — Z86.718 HX OF DEEP VENOUS THROMBOSIS: ICD-10-CM

## 2025-07-21 DIAGNOSIS — Z34.90 THIRD PREGNANCY: ICD-10-CM

## 2025-07-21 DIAGNOSIS — Z15.89 MTHFR MUTATION: Chronic | ICD-10-CM

## 2025-07-21 DIAGNOSIS — Z82.49 FAMILY HISTORY OF DVT: ICD-10-CM

## 2025-07-21 LAB
FACTOR VIII ACTIVITY: 256 % (ref 50–150)
PROTEIN C ACTIVITY: 80 %
PROTEIN S ACTIVITY: 61 % (ref 59–130)

## 2025-07-21 PROCEDURE — 76811 OB US DETAILED SNGL FETUS: CPT | Performed by: OBSTETRICS & GYNECOLOGY

## 2025-07-21 PROCEDURE — 99999 PR OFFICE/OUTPT VISIT,PROCEDURE ONLY: CPT | Performed by: OBSTETRICS & GYNECOLOGY

## 2025-07-21 NOTE — PROGRESS NOTES
[facility]  [address]  [place] [address]  Dept: [phone]     2025    RE:  Linda Sage     : 1995   AGE: 29 y.o.     Dear Dr. Borjas,    Thank you for allowing me to see Linda Sage.  As I'm sure you will recall, Linda Sage is a 29 y.o. [id number]'s last menstrual period was 2025 (exact date). Estimated Date of Delivery: 25 at 21w2d seen in our office today for the following:    REASON FOR VISIT: Level II    Patient Active Problem List    Diagnosis Date Noted    GBS (group B Streptococcus carrier), +RV culture, currently pregnant 2025    GBS carrier 2025    MTHFR mutation 10/18/2022    Hx of DVT of leg 2020 10/18/2022    21 weeks gestation of pregnancy 2025    Third pregnancy 2025    Pulmonary embolism affecting pregnancy, antepartum 2025    Rh+/RI/GBSpos 2023    FHx of DVT 2022    Hypercoagulable state 2021        PAST HISTORY:  OB History    Para Term  AB Living   3 2 2 0 0 2   SAB IAB Ectopic Molar Multiple Live Births   0 0 0  0 2      # Outcome Date GA Lbr Tej/2nd Weight Sex Type Anes PTL Lv   3 Current            2 Term 23 37w1d 00:55 / 00:01 2.98 kg (6 lb 9.1 oz) F Vag-Spont None N MARLENY   1 Term 10/27/12 40w0d  3.742 kg (8 lb 4 oz) M Vag-Spont   MARLENY          MEDICAL:  Past Medical History:   Diagnosis Date    Carpal tunnel syndrome on right 2018    Gestational hypertension (G2) 2023    History of DVT of lower extremity     MTHFR mutation     PreE w/o SF (G2) 2023    Pulmonary embolism (HCC) 2025     23 F Apg 8/9 Wt 6#9  2023    Ultrasound recheck of fetal pyelectasis, antepartum 2012    Varicose veins of both lower extremities, unspecified whether complicated         SURGICAL:  Past Surgical History:   Procedure Laterality Date    CARPAL TUNNEL RELEASE Right

## 2025-07-22 ENCOUNTER — OFFICE VISIT (OUTPATIENT)
Age: 30
End: 2025-07-22
Payer: COMMERCIAL

## 2025-07-22 ENCOUNTER — TELEPHONE (OUTPATIENT)
Age: 30
End: 2025-07-22

## 2025-07-22 VITALS
DIASTOLIC BLOOD PRESSURE: 71 MMHG | TEMPERATURE: 97.8 F | BODY MASS INDEX: 30.6 KG/M2 | WEIGHT: 189.6 LBS | OXYGEN SATURATION: 98 % | HEART RATE: 87 BPM | SYSTOLIC BLOOD PRESSURE: 105 MMHG

## 2025-07-22 DIAGNOSIS — O88.219 PULMONARY EMBOLISM AFFECTING PREGNANCY, ANTEPARTUM: Primary | ICD-10-CM

## 2025-07-22 DIAGNOSIS — D68.59 PROTEIN C DEFICIENCY: ICD-10-CM

## 2025-07-22 DIAGNOSIS — R79.1 ELEVATED FACTOR VIII LEVEL: ICD-10-CM

## 2025-07-22 PROCEDURE — 99213 OFFICE O/P EST LOW 20 MIN: CPT | Performed by: INTERNAL MEDICINE

## 2025-07-22 PROCEDURE — 99214 OFFICE O/P EST MOD 30 MIN: CPT | Performed by: INTERNAL MEDICINE

## 2025-07-22 PROCEDURE — 99211 OFF/OP EST MAY X REQ PHY/QHP: CPT | Performed by: INTERNAL MEDICINE

## 2025-07-22 NOTE — PROGRESS NOTES
Patient ID: Linda Sage, 1995, [id number], 29 y.o.  Referred by :  No ref. provider found   Reason for consultation: History of pregnancy-induced pulm embolism      HISTORY OF PRESENT ILLNESS:    Oncologic History:    Linda Sage is a very pleasant 29 y.o. female.  Who is in the second trimester with MTHFR mutation and history of DVT pulmonary embolism provoked by pregnancy  Patient initially had unprovoked DVT in 2020 was on blood thinner for 8 months recently on April 19, 2025 developed pulmonary embolism  There is also strong family history of DVT  At this time patient is on Lovenox  Previous thrombophilia workup was done on May 22, 2021 and negative for prothrombin gene mutation and normal Antithrombin III no antiphospholipid and borderline protein C activity deficiency  Negative factor V  Heterozygous MTHFR mutation  Patient currently on Lovenox      History of Present Illness  The patient is a 39-year-old female who presents for evaluation of deep vein thrombosis (DVT) and pulmonary embolism (PE).    She is currently 21 weeks pregnant and reports no recent complications or side effects. She is receiving injections in the abdomen. Her due date is at the end of November 2025, and she plans to deliver at [facility]. She intends to breastfeed post-delivery. She is curious about whether her current blood thinners could affect her test results, as she believes her protein C levels should be higher while on Lovenox. She also inquires about the appropriate age for her children to be tested for similar issues. She has not yet decided on the type of anesthesia for delivery.    She has a history of DVT in 2020, which occurred outside of pregnancy, and a PE earlier this year during her current pregnancy. She was not tested for factor VIII prior to her pregnancy. Her protein C level was found to be low at 70 during her last test. She has been tested for factor V, which was negative. She was

## 2025-07-22 NOTE — TELEPHONE ENCOUNTER
AVS 07/22/25    Instructions   from Dr. Ronnell Cuevas MD    Rtc in 5-6 months with labs    Labs ordered today  Factor VIII Activity  Complete this on or around 7/22/2025.  Protein C Activity  Complete this on or around 7/22/2025.  Protein C Antigen, Total  Complete this on or around 7/22/2025.    RV 01/06/26    Labs to be done week prior    PT was given AVS and appt schedule      Electronically signed by Abimbola Sifuentes on 7/22/2025 at 3:33 PM

## 2025-08-13 ENCOUNTER — ROUTINE PRENATAL (OUTPATIENT)
Dept: OBGYN CLINIC | Age: 30
End: 2025-08-13
Payer: COMMERCIAL

## 2025-08-13 VITALS
WEIGHT: 190 LBS | HEART RATE: 86 BPM | DIASTOLIC BLOOD PRESSURE: 72 MMHG | SYSTOLIC BLOOD PRESSURE: 106 MMHG | BODY MASS INDEX: 30.67 KG/M2

## 2025-08-13 DIAGNOSIS — Z3A.24 24 WEEKS GESTATION OF PREGNANCY: ICD-10-CM

## 2025-08-13 DIAGNOSIS — Z34.90 THIRD PREGNANCY: ICD-10-CM

## 2025-08-13 DIAGNOSIS — O09.92 HIGH-RISK PREGNANCY IN SECOND TRIMESTER: Primary | ICD-10-CM

## 2025-08-13 DIAGNOSIS — O88.219 PULMONARY EMBOLISM AFFECTING PREGNANCY, ANTEPARTUM: ICD-10-CM

## 2025-08-13 DIAGNOSIS — Z15.89 MTHFR MUTATION: Chronic | ICD-10-CM

## 2025-08-13 DIAGNOSIS — Z86.718 HX OF DEEP VENOUS THROMBOSIS: ICD-10-CM

## 2025-08-13 DIAGNOSIS — Z22.330 GBS CARRIER: ICD-10-CM

## 2025-08-13 DIAGNOSIS — O99.820 GBS (GROUP B STREPTOCOCCUS CARRIER), +RV CULTURE, CURRENTLY PREGNANT: ICD-10-CM

## 2025-08-13 PROBLEM — Z3A.21 21 WEEKS GESTATION OF PREGNANCY: Status: RESOLVED | Noted: 2025-07-21 | Resolved: 2025-08-13

## 2025-08-13 PROCEDURE — 99213 OFFICE O/P EST LOW 20 MIN: CPT | Performed by: NURSE PRACTITIONER
